# Patient Record
Sex: MALE | Race: WHITE | NOT HISPANIC OR LATINO | Employment: FULL TIME | URBAN - METROPOLITAN AREA
[De-identification: names, ages, dates, MRNs, and addresses within clinical notes are randomized per-mention and may not be internally consistent; named-entity substitution may affect disease eponyms.]

---

## 2017-04-09 ENCOUNTER — HOSPITAL ENCOUNTER (OUTPATIENT)
Facility: HOSPITAL | Age: 47
Discharge: HOME | End: 2017-04-11
Attending: EMERGENCY MEDICINE | Admitting: INTERNAL MEDICINE
Payer: MEDICAID

## 2017-04-09 DIAGNOSIS — R07.9 CHEST PAIN, UNSPECIFIED TYPE: ICD-10-CM

## 2017-04-09 DIAGNOSIS — I10 ESSENTIAL HYPERTENSION: Chronic | ICD-10-CM

## 2017-04-09 DIAGNOSIS — R79.89 ELEVATED D-DIMER: ICD-10-CM

## 2017-04-09 DIAGNOSIS — F33.0 MILD RECURRENT MAJOR DEPRESSION: Chronic | ICD-10-CM

## 2017-04-09 DIAGNOSIS — R07.9 CHEST PAIN: Primary | ICD-10-CM

## 2017-04-09 DIAGNOSIS — R94.39 ABNORMAL NUCLEAR STRESS TEST: ICD-10-CM

## 2017-04-09 DIAGNOSIS — K21.9 GASTROESOPHAGEAL REFLUX DISEASE WITHOUT ESOPHAGITIS: ICD-10-CM

## 2017-04-09 LAB
ALBUMIN SERPL BCP-MCNC: 3.8 G/DL
ALP SERPL-CCNC: 61 U/L
ALT SERPL W/O P-5'-P-CCNC: 34 U/L
ANION GAP SERPL CALC-SCNC: 8 MMOL/L
AST SERPL-CCNC: 27 U/L
BASOPHILS # BLD AUTO: 0.03 K/UL
BASOPHILS NFR BLD: 0.5 %
BILIRUB SERPL-MCNC: 0.4 MG/DL
BNP SERPL-MCNC: 15 PG/ML
BUN SERPL-MCNC: 22 MG/DL
CALCIUM SERPL-MCNC: 9.1 MG/DL
CHLORIDE SERPL-SCNC: 104 MMOL/L
CO2 SERPL-SCNC: 28 MMOL/L
CREAT SERPL-MCNC: 1.2 MG/DL
D DIMER PPP IA.FEU-MCNC: 0.88 MG/L FEU
DIFFERENTIAL METHOD: ABNORMAL
EOSINOPHIL # BLD AUTO: 0.2 K/UL
EOSINOPHIL NFR BLD: 3.3 %
ERYTHROCYTE [DISTWIDTH] IN BLOOD BY AUTOMATED COUNT: 13.6 %
EST. GFR  (AFRICAN AMERICAN): >60 ML/MIN/1.73 M^2
EST. GFR  (NON AFRICAN AMERICAN): >60 ML/MIN/1.73 M^2
GLUCOSE SERPL-MCNC: 85 MG/DL
HCT VFR BLD AUTO: 41.2 %
HGB BLD-MCNC: 13.4 G/DL
INR PPP: 1
LYMPHOCYTES # BLD AUTO: 1.4 K/UL
LYMPHOCYTES NFR BLD: 23.8 %
MAGNESIUM SERPL-MCNC: 2 MG/DL
MCH RBC QN AUTO: 25.8 PG
MCHC RBC AUTO-ENTMCNC: 32.5 %
MCV RBC AUTO: 79 FL
MONOCYTES # BLD AUTO: 0.5 K/UL
MONOCYTES NFR BLD: 9.2 %
NEUTROPHILS # BLD AUTO: 3.6 K/UL
NEUTROPHILS NFR BLD: 63 %
PLATELET # BLD AUTO: 228 K/UL
PMV BLD AUTO: 9.4 FL
POTASSIUM SERPL-SCNC: 4 MMOL/L
PROT SERPL-MCNC: 6.5 G/DL
PROTHROMBIN TIME: 10.9 SEC
RBC # BLD AUTO: 5.19 M/UL
SODIUM SERPL-SCNC: 140 MMOL/L
TROPONIN I SERPL DL<=0.01 NG/ML-MCNC: <0.006 NG/ML
WBC # BLD AUTO: 5.68 K/UL

## 2017-04-09 PROCEDURE — 85379 FIBRIN DEGRADATION QUANT: CPT

## 2017-04-09 PROCEDURE — 83735 ASSAY OF MAGNESIUM: CPT

## 2017-04-09 PROCEDURE — 25000003 PHARM REV CODE 250: Performed by: EMERGENCY MEDICINE

## 2017-04-09 PROCEDURE — 96374 THER/PROPH/DIAG INJ IV PUSH: CPT

## 2017-04-09 PROCEDURE — 83880 ASSAY OF NATRIURETIC PEPTIDE: CPT

## 2017-04-09 PROCEDURE — 85610 PROTHROMBIN TIME: CPT

## 2017-04-09 PROCEDURE — 85025 COMPLETE CBC W/AUTO DIFF WBC: CPT

## 2017-04-09 PROCEDURE — 99285 EMERGENCY DEPT VISIT HI MDM: CPT | Mod: 25

## 2017-04-09 PROCEDURE — 80053 COMPREHEN METABOLIC PANEL: CPT

## 2017-04-09 PROCEDURE — 84484 ASSAY OF TROPONIN QUANT: CPT

## 2017-04-09 RX ORDER — ASPIRIN 325 MG
325 TABLET ORAL
Status: COMPLETED | OUTPATIENT
Start: 2017-04-09 | End: 2017-04-09

## 2017-04-09 RX ORDER — ESOMEPRAZOLE MAGNESIUM 40 MG/1
40 CAPSULE, DELAYED RELEASE ORAL
COMMUNITY
End: 2019-07-14

## 2017-04-09 RX ORDER — NITROGLYCERIN 0.4 MG/1
0.4 TABLET SUBLINGUAL EVERY 5 MIN PRN
Status: DISCONTINUED | OUTPATIENT
Start: 2017-04-09 | End: 2017-04-10 | Stop reason: SDUPTHER

## 2017-04-09 RX ORDER — METOPROLOL TARTRATE 1 MG/ML
5 INJECTION, SOLUTION INTRAVENOUS
Status: COMPLETED | OUTPATIENT
Start: 2017-04-09 | End: 2017-04-09

## 2017-04-09 RX ADMIN — ASPIRIN 325 MG ORAL TABLET 325 MG: 325 PILL ORAL at 08:04

## 2017-04-09 RX ADMIN — METOPROLOL TARTRATE 5 MG: 5 INJECTION INTRAVENOUS at 08:04

## 2017-04-09 NOTE — IP AVS SNAPSHOT
[place] [address] [place] [address]  Phone: [phone]           Patient Discharge Instructions   Our goal is to set you up for success. This packet includes information on your condition, medications, and your home care.  It will help you care for yourself to prevent having to return to the hospital.     Please ask your nurse if you have any questions.      There are many details to remember when preparing to leave the hospital. Here is what you will need to do:    1. Take your medicine. If you are prescribed medications, review your Medication List on the following pages. You may have new medications to  at the pharmacy and others that you'll need to stop taking. Review the instructions for how and when to take your medications. Talk with your doctor or nurses if you are unsure of what to do.     2. Go to your follow-up appointments. Specific follow-up information is listed in the following pages. Your may be contacted by a nurse or clinical provider about future appointments. Be sure we have all of the phone numbers to reach you. Please contact your provider's office if you are unable to make an appointment.     3. Watch for warning signs. Your doctor or nurse will give you detailed warning signs to watch for and when to call for assistance. These instructions may also include educational information about your condition. If you experience any of warning signs to your health, call your doctor.           [facility]  Unless otherwise directed by your provider, please   contact [facility], our nurse care line   that is available for 24/7 assistance.     [phone] (toll-free)     Registered nurses in the [facility]   provide: appointment scheduling, clinical advisement, health education, and other advisory services.                  ** Verify the list of medication(s) below is accurate and up to date. Carry this with you in case of emergency.  If your medications have changed, please notify your healthcare provider.             Medication List      START taking these medications        Additional Info                      aspirin 81 MG EC tablet   Commonly known as:  ECOTRIN   Refills:  0   Dose:  81 mg    Last time this was given:  81 mg on 4/11/2017  9:44 AM   Instructions:  Take 1 tablet (81 mg total) by mouth once daily.     Begin Date    AM    Noon    PM    Bedtime       atorvastatin 40 MG tablet   Commonly known as:  LIPITOR   Quantity:  30 tablet   Refills:  2   Dose:  20 mg    Last time this was given:  40 mg on 4/10/2017  8:17 PM   Instructions:  Take 0.5 tablets (20 mg total) by mouth every evening.     Begin Date    AM    Noon    PM    Bedtime         CONTINUE taking these medications        Additional Info                      buPROPion 200 MG Tbsr   Commonly known as:  WELLBUTRIN SR   Refills:  0   Dose:  100 mg    Instructions:  Take 100 mg by mouth 2 (two) times daily.     Begin Date    AM    Noon    PM    Bedtime       esomeprazole 40 MG capsule   Commonly known as:  NEXIUM   Refills:  0   Dose:  40 mg    Instructions:  Take 40 mg by mouth before breakfast.     Begin Date    AM    Noon    PM    Bedtime       fluoxetine 20 MG capsule   Commonly known as:  PROZAC   Refills:  0      Begin Date    AM    Noon    PM    Bedtime       hydrochlorothiazide 25 MG tablet   Commonly known as:  HYDRODIURIL   Quantity:  30 tablet   Refills:  5   Dose:  25 mg    Last time this was given:  25 mg on 4/11/2017  9:44 AM   Instructions:  Take 1 tablet (25 mg total) by mouth once daily.     Begin Date    AM    Noon    PM    Bedtime       hydrocodone-acetaminophen 5-325mg 5-325 mg per tablet   Commonly known as:  NORCO   Quantity:  20 tablet   Refills:  0   Dose:  1 tablet    Instructions:  Take 1 tablet by mouth every 4 (four) hours as needed for Pain.     Begin Date    AM    Noon    PM    Bedtime       mupirocin 2 % ointment   Commonly known as:  BACTROBAN    Refills:  0      Begin Date    AM    Noon    PM    Bedtime       naproxen 500 MG tablet   Commonly known as:  NAPROSYN   Quantity:  14 tablet   Refills:  0   Dose:  500 mg    Instructions:  Take 1 tablet (500 mg total) by mouth 2 (two) times daily with meals.     Begin Date    AM    Noon    PM    Bedtime       omeprazole 40 MG capsule   Commonly known as:  PRILOSEC   Refills:  0   Dose:  40 mg    Instructions:  Take 40 mg by mouth once daily.     Begin Date    AM    Noon    PM    Bedtime       sulfamethoxazole-trimethoprim 800-160mg 800-160 mg Tab   Commonly known as:  BACTRIM DS   Refills:  0      Begin Date    AM    Noon    PM    Bedtime            Where to Get Your Medications      These medications were sent to [place] [id number] [facility] [address]     Phone:  [phone]     atorvastatin 40 MG tablet         You can get these medications from any pharmacy     You don't need a prescription for these medications     aspirin 81 MG EC tablet                  Please bring to all follow up appointments:    1. A copy of your discharge instructions.  2. All medicines you are currently taking in their original bottles.  3. Identification and insurance card.    Please arrive 15 minutes ahead of scheduled appointment time.    Please call 24 hours in advance if you must reschedule your appointment and/or time.        Your Scheduled Appointments     Apr 20, 2017  9:40 AM CDT   Established Patient Visit with Danny Mason MD   [facility] [place] - Cardiology ([facility])    [address] [facility] [address]  [place] [address] [phone]              Follow-up Information     Follow up with Rupert Alcantar MD On 4/25/2017.    Specialty:  Family Medicine    Why:  out patient services:  11:15 a.m. follow up from the  hospital     Contact information:    [address]  [address]  [place] [address] [phone]          Follow up with Danny MARIE  MD [place] On 4/20/2017.    Specialties:  Cardiology, INTERVENTIONAL CARDIOLOGY    Why:  out patient services:  9;40 a.m. follow up from the hospital    Contact information:    [address]  [address]  [place]  [phone]          Discharge Instructions     Future Orders    Activity as tolerated     Diet general     Questions:    Total calories:      Fat restriction, if any:      Protein restriction, if any:      Na restriction, if any:  2gNa    Fluid restriction:      Additional restrictions:          Primary Diagnosis     Your primary diagnosis was:  Chest Pain      Admission Information     Date & Time Provider Department CSN    4/9/2017  8:10 PM Miriam Leal MD [facility] [id number]      Care Providers     Provider Role Specialty Primary office phone    Miriam Leal MD Attending Provider Hospitalist [phone]    Danny Mason MD Consulting Physician  Cardiology [phone]      Your Vitals Were     BP Pulse Temp Resp Height Weight    131/77 (BP Method: Automatic) 58 98.2 °F (36.8 °C) (Oral) 18 6' (1.829 m) 124.8 kg (275 lb 3.2 oz)    SpO2 BMI             96% 37.32 kg/m2         Recent Lab Values     No lab values to display.      Allergies as of 4/11/2017     No Known Allergies      Advance Directives     An advance directive is a document which, in the event you are no longer able to make decisions for yourself, tells your healthcare team what kind of treatment you do or do not want to receive, or who you would like to make those decisions for you.  If you do not currently have an advance directive, [facility] encourages you to create one.  For more information call:  [phone]), [phone] ([phone]),  or log on to [email or web address].        Language Assistance Services     ATTENTION: Language assistance services are available, free of charge. Please call [phone].      ATENCIÓN: Si habla español, tiene a carmen disposición servicios  devos de asistencia lingüística. Alvino stevens [phone].     ANGELIA Ý: N?u b?n nói Ti?ng Vi?t, có các d?ch v? h? tr? ngôn ng? mi?n phí dành cho b?n. G?i s? [phone].         [facility] complies with applicable Federal civil rights laws and does not discriminate on the basis of race, color, national origin, age, disability, or sex.

## 2017-04-10 PROBLEM — R79.89 ELEVATED D-DIMER: Status: ACTIVE | Noted: 2017-04-10

## 2017-04-10 PROBLEM — K21.9 GASTROESOPHAGEAL REFLUX DISEASE WITHOUT ESOPHAGITIS: Status: ACTIVE | Noted: 2017-04-10

## 2017-04-10 PROBLEM — R07.9 CHEST PAIN: Status: ACTIVE | Noted: 2017-04-10

## 2017-04-10 LAB
APTT BLDCRRT: 26.5 SEC
CHOLEST/HDLC SERPL: 7.1 {RATIO}
DIASTOLIC DYSFUNCTION: NO
DIASTOLIC DYSFUNCTION: NO
ESTIMATED PA SYSTOLIC PRESSURE: 27.71
GLOBAL PERICARDIAL EFFUSION: NORMAL
HDL/CHOLESTEROL RATIO: 14 %
HDLC SERPL-MCNC: 157 MG/DL
HDLC SERPL-MCNC: 22 MG/DL
INR PPP: 1
LDLC SERPL CALC-MCNC: 82.6 MG/DL
MITRAL VALVE MOBILITY: NORMAL
MITRAL VALVE REGURGITATION: NORMAL
NONHDLC SERPL-MCNC: 135 MG/DL
PROTHROMBIN TIME: 10.8 SEC
RETIRED EF AND QEF - SEE NOTES: 55 (ref 55–65)
T4 FREE SERPL-MCNC: 0.73 NG/DL
TRICUSPID VALVE REGURGITATION: NORMAL
TRIGL SERPL-MCNC: 262 MG/DL
TROPONIN I SERPL DL<=0.01 NG/ML-MCNC: <0.006 NG/ML
TROPONIN I SERPL DL<=0.01 NG/ML-MCNC: <0.006 NG/ML
TSH SERPL DL<=0.005 MIU/L-ACNC: 4.32 UIU/ML

## 2017-04-10 PROCEDURE — 93016 CV STRESS TEST SUPVJ ONLY: CPT | Mod: ,,, | Performed by: INTERNAL MEDICINE

## 2017-04-10 PROCEDURE — 36415 COLL VENOUS BLD VENIPUNCTURE: CPT

## 2017-04-10 PROCEDURE — 80061 LIPID PANEL: CPT

## 2017-04-10 PROCEDURE — 78452 HT MUSCLE IMAGE SPECT MULT: CPT | Mod: 26,,, | Performed by: INTERNAL MEDICINE

## 2017-04-10 PROCEDURE — 93018 CV STRESS TEST I&R ONLY: CPT | Mod: ,,, | Performed by: INTERNAL MEDICINE

## 2017-04-10 PROCEDURE — 93306 TTE W/DOPPLER COMPLETE: CPT

## 2017-04-10 PROCEDURE — 63600175 PHARM REV CODE 636 W HCPCS

## 2017-04-10 PROCEDURE — G0378 HOSPITAL OBSERVATION PER HR: HCPCS

## 2017-04-10 PROCEDURE — 85610 PROTHROMBIN TIME: CPT

## 2017-04-10 PROCEDURE — 85730 THROMBOPLASTIN TIME PARTIAL: CPT

## 2017-04-10 PROCEDURE — 99220 PR INITIAL OBSERVATION CARE,LEVL III: CPT | Mod: 25,,, | Performed by: INTERNAL MEDICINE

## 2017-04-10 PROCEDURE — 84484 ASSAY OF TROPONIN QUANT: CPT | Mod: 91

## 2017-04-10 PROCEDURE — 93017 CV STRESS TEST TRACING ONLY: CPT

## 2017-04-10 PROCEDURE — 25000003 PHARM REV CODE 250: Performed by: INTERNAL MEDICINE

## 2017-04-10 PROCEDURE — 93306 TTE W/DOPPLER COMPLETE: CPT | Mod: 26,,, | Performed by: INTERNAL MEDICINE

## 2017-04-10 PROCEDURE — 84439 ASSAY OF FREE THYROXINE: CPT

## 2017-04-10 PROCEDURE — 25500020 PHARM REV CODE 255: Performed by: HOSPITALIST

## 2017-04-10 PROCEDURE — 84443 ASSAY THYROID STIM HORMONE: CPT

## 2017-04-10 PROCEDURE — 25000003 PHARM REV CODE 250: Performed by: EMERGENCY MEDICINE

## 2017-04-10 RX ORDER — ACETAMINOPHEN 325 MG/1
650 TABLET ORAL EVERY 6 HOURS PRN
Status: DISCONTINUED | OUTPATIENT
Start: 2017-04-10 | End: 2017-04-11 | Stop reason: HOSPADM

## 2017-04-10 RX ORDER — MAG HYDROX/ALUMINUM HYD/SIMETH 200-200-20
30 SUSPENSION, ORAL (FINAL DOSE FORM) ORAL EVERY 6 HOURS PRN
Status: DISCONTINUED | OUTPATIENT
Start: 2017-04-10 | End: 2017-04-11 | Stop reason: HOSPADM

## 2017-04-10 RX ORDER — REGADENOSON 0.08 MG/ML
INJECTION, SOLUTION INTRAVENOUS
Status: DISCONTINUED
Start: 2017-04-10 | End: 2017-04-10 | Stop reason: WASHOUT

## 2017-04-10 RX ORDER — HYDROCHLOROTHIAZIDE 25 MG/1
25 TABLET ORAL DAILY
Status: DISCONTINUED | OUTPATIENT
Start: 2017-04-10 | End: 2017-04-11 | Stop reason: HOSPADM

## 2017-04-10 RX ORDER — ASPIRIN 325 MG
325 TABLET ORAL DAILY
Status: DISCONTINUED | OUTPATIENT
Start: 2017-04-10 | End: 2017-04-10

## 2017-04-10 RX ORDER — ONDANSETRON 2 MG/ML
4 INJECTION INTRAMUSCULAR; INTRAVENOUS EVERY 8 HOURS PRN
Status: DISCONTINUED | OUTPATIENT
Start: 2017-04-10 | End: 2017-04-11 | Stop reason: HOSPADM

## 2017-04-10 RX ORDER — CLOPIDOGREL 300 MG/1
300 TABLET, FILM COATED ORAL ONCE
Status: COMPLETED | OUTPATIENT
Start: 2017-04-10 | End: 2017-04-10

## 2017-04-10 RX ORDER — ATORVASTATIN CALCIUM 40 MG/1
40 TABLET, FILM COATED ORAL NIGHTLY
Status: DISCONTINUED | OUTPATIENT
Start: 2017-04-10 | End: 2017-04-11 | Stop reason: HOSPADM

## 2017-04-10 RX ORDER — SODIUM CHLORIDE 9 MG/ML
INJECTION, SOLUTION INTRAVENOUS CONTINUOUS
Status: DISCONTINUED | OUTPATIENT
Start: 2017-04-11 | End: 2017-04-11

## 2017-04-10 RX ORDER — NITROGLYCERIN 0.4 MG/1
0.4 TABLET SUBLINGUAL EVERY 5 MIN PRN
Status: DISCONTINUED | OUTPATIENT
Start: 2017-04-10 | End: 2017-04-11 | Stop reason: HOSPADM

## 2017-04-10 RX ORDER — ASPIRIN 81 MG/1
81 TABLET ORAL DAILY
Status: DISCONTINUED | OUTPATIENT
Start: 2017-04-11 | End: 2017-04-11 | Stop reason: HOSPADM

## 2017-04-10 RX ORDER — PANTOPRAZOLE SODIUM 40 MG/1
40 TABLET, DELAYED RELEASE ORAL DAILY
Status: DISCONTINUED | OUTPATIENT
Start: 2017-04-10 | End: 2017-04-11 | Stop reason: HOSPADM

## 2017-04-10 RX ORDER — CLOPIDOGREL BISULFATE 75 MG/1
75 TABLET ORAL DAILY
Status: DISCONTINUED | OUTPATIENT
Start: 2017-04-11 | End: 2017-04-11

## 2017-04-10 RX ADMIN — HYDROCHLOROTHIAZIDE 25 MG: 25 TABLET ORAL at 08:04

## 2017-04-10 RX ADMIN — ATORVASTATIN CALCIUM 40 MG: 40 TABLET, FILM COATED ORAL at 08:04

## 2017-04-10 RX ADMIN — ACETAMINOPHEN 650 MG: 325 TABLET ORAL at 02:04

## 2017-04-10 RX ADMIN — PANTOPRAZOLE SODIUM 40 MG: 40 TABLET, DELAYED RELEASE ORAL at 08:04

## 2017-04-10 RX ADMIN — IOHEXOL 70 ML: 350 INJECTION, SOLUTION INTRAVENOUS at 10:04

## 2017-04-10 RX ADMIN — ASPIRIN 325 MG ORAL TABLET 325 MG: 325 PILL ORAL at 08:04

## 2017-04-10 RX ADMIN — CLOPIDOGREL BISULFATE 300 MG: 300 TABLET, FILM COATED ORAL at 06:04

## 2017-04-10 NOTE — PLAN OF CARE
Problem: Fall Risk (Adult)  Intervention: Monitor/Assist with Self Care    04/10/17 0230   Functional Level Current   Ambulation 0 - independent   Transferring 0 - independent   Toileting 0 - independent   Bathing 0 - independent   Dressing 0 - independent   Eating 0 - independent   Communication 0 - understands/communicates without difficulty   Swallowing 0 - swallows foods/liquids without difficulty   Daily Care Interventions   Self-Care Promotion independence encouraged   Activity   Activity Assistance Provided independent       Intervention: Reduce Risk/Promote Restraint Free Environment    04/10/17 0428   Safety Interventions   Environmental Safety Modification clutter free environment maintained;lighting adjusted;room near unit station;room organization consistent       Intervention: Patient Rounds    04/10/17 0428   Safety Interventions   Patient Rounds bed in low position;bed wheels locked;call light in reach;placement of personal items at bedside;ID band on;clutter free environment maintained;toileting offered;visualized patient       Intervention: Safety Promotion/Fall Prevention    04/10/17 0230   Safety Interventions   Safety Promotion/Fall Prevention lighting adjusted;medications reviewed;room near unit station;side rails raised x 2         Goal: Absence of Falls  Patient will demonstrate the desired outcomes by discharge/transition of care.     04/10/17 0428   Fall Risk (Adult)   Absence of Falls making progress toward outcome         Problem: Patient Care Overview  Goal: Plan of Care Review    04/10/17 0230   Coping/Psychosocial   Plan Of Care Reviewed With patient         Problem: Cardiac: ACS (Acute Coronary Syndrome) (Adult)  Intervention: Optimize Myocardial Oxygenation/Perfusion    04/10/17 0230 04/10/17 0428   Respiratory Interventions   Airway/Ventilation Management --  airway patency maintained   Activity   Activity Type bedrest with bathroom privileges --        Intervention: Prevent/Manage  Thrombi/Emboli    04/10/17 0230 04/10/17 0428   Safety Interventions   Bleeding Precautions --  blood pressure closely monitored   OTHER   VTE Required Core Measure Pharmacological prophylaxis initiated/maintained --          Goal: Signs and Symptoms of Listed Potential Problems Will be Absent, Minimized or Managed (Cardiac: ACS)  Signs and symptoms of listed potential problems will be absent, minimized or managed by discharge/transition of care (reference Cardiac: ACS (Acute Coronary Syndrome) (Adult) CPG).    04/10/17 0428   Cardiac: ACS (Acute Coronary Syndrome)   Problems Assessed (Acute Coronary Syndrome) all   Problems Present (Acute Coronary Syndrome) chest pain (angina);situational response

## 2017-04-10 NOTE — ASSESSMENT & PLAN NOTE
Both typical and atypical features.  Occurred at rest, but resolved with NTG/ASA.  EKG nondiagnostic and EKG normal.  Elev D-dimer also noted  Will check Ex MPI and Echo  Check lipid panel  CTA chest deferred to IM svc (case d/w TYLOR Daniel)

## 2017-04-10 NOTE — H&P
"[facility] Hospital Medicine  History & Physical    Patient Name: Dean Lozano  MRN: [id number]  Admission Date: 4/9/2017  Attending Physician: Miriam schafer  Primary Care Provider: Ayah De Leon MD         Patient information was obtained from patient and ER records.     Subjective:     Principal Problem:Chest pain    Chief Complaint:   Chief Complaint   Patient presents with    Chest Pain     PT C/O CP THAT RADIATES DOWN LA, 2/10 PAIN. VSS, NO S/S OF ACUTE DISTRESS AT THIS TIME.        HPI: 46 yo male with significant history for hypertension and depression (no longer on wellbutrin or prozac who is admitted to observation for acute sudden left chest pain radiating down left arm that occurred at 630 pm yesterday while driving. Patient is a . Symptoms associated with shortness of breath but denies headaches, dizziness, change in vision, palpitations, diaphoresis, orthopnea, PND, abdominal pain, nausea, or vomiting, or extremities weakness. Chest pain would eased up but return shortly with more intense pain each time. Patient return home where he called EMS. Received ASA, nitro SL x 3, and nitro paste before pain improved. Denies recent sick contacts, travel, or injury. Takes aleve as needed (last dose 3 weeks ago). No new physical limitation in the past month. Prior to event, in usual state of health. No recent heavy exercise. Denies smoking or illicit drug use. Occasionally drinks alcohol. No family of MI/SCD. Report "cholesterol always good."    In ED, EKG SB no ischemic changes and troponin negative x 2. D dimer positive 0.88. CXR clear. Stress test ordered.         Past Medical History:   Diagnosis Date    Anxiety     GERD (gastroesophageal reflux disease)     Hypertension        Past Surgical History:   Procedure Laterality Date    HAND SURGERY      ROTATOR CUFF REPAIR      SHOULDER SURGERY         Review of patient's allergies indicates:  No Known Allergies    No " current facility-administered medications on file prior to encounter.      Current Outpatient Prescriptions on File Prior to Encounter   Medication Sig    hydrochlorothiazide (HYDRODIURIL) 25 MG tablet Take 1 tablet (25 mg total) by mouth once daily.    buPROPion (WELLBUTRIN SR) 200 MG TbSR Take 100 mg by mouth 2 (two) times daily.    fluoxetine (PROZAC) 20 MG capsule     hydrocodone-acetaminophen 5-325mg (NORCO) 5-325 mg per tablet Take 1 tablet by mouth every 4 (four) hours as needed for Pain.    mupirocin (BACTROBAN) 2 % ointment     naproxen (NAPROSYN) 500 MG tablet Take 1 tablet (500 mg total) by mouth 2 (two) times daily with meals.    omeprazole (PRILOSEC) 40 MG capsule Take 40 mg by mouth once daily.    sulfamethoxazole-trimethoprim 800-160mg (BACTRIM DS) 800-160 mg Tab      Family History     None        Social History Main Topics    Smoking status: Never Smoker    Smokeless tobacco: Not on file    Alcohol use Not on file    Drug use: Not on file    Sexual activity: Not on file     Review of Systems   Constitutional: Negative.    HENT: Negative.    Eyes: Negative.    Respiratory: Positive for chest tightness and shortness of breath. Negative for cough, wheezing and stridor.    Cardiovascular: Positive for chest pain. Negative for palpitations and leg swelling.   Gastrointestinal: Negative.    Genitourinary: Negative.    Musculoskeletal: Negative.    Skin: Negative.    Neurological: Numbness: right arm.   Psychiatric/Behavioral: Negative.      Objective:     Vital Signs (Most Recent):  Temp: 98.2 °F (36.8 °C) (04/10/17 0800)  Pulse: 65 (04/10/17 0800)  Resp: 17 (04/10/17 0800)  BP: 118/68 (04/10/17 0800)  SpO2: 95 % (04/10/17 0800) Vital Signs (24h Range):  Temp:  [98.2 °F (36.8 °C)-98.6 °F (37 °C)] 98.2 °F (36.8 °C)  Pulse:  [54-80] 65  Resp:  [15-17] 17  SpO2:  [94 %-98 %] 95 %  BP: (118-141)/(60-88) 118/68     Weight: 123.2 kg (271 lb 8 oz)  Body mass index is 36.82 kg/(m^2).    Physical Exam    Constitutional: He is oriented to person, place, and time. He appears well-developed and well-nourished. No distress.   HENT:   Head: Normocephalic and atraumatic.   Eyes: EOM are normal. Pupils are equal, round, and reactive to light. Left eye exhibits no discharge.   Neck: Normal range of motion. Neck supple.   Cardiovascular: Normal rate and regular rhythm.    No murmur heard.  Pulmonary/Chest: Effort normal and breath sounds normal.   Abdominal: Soft. Bowel sounds are normal.   Musculoskeletal: Normal range of motion. He exhibits no edema, tenderness or deformity.   Neurological: He is alert and oriented to person, place, and time. No cranial nerve deficit.   Skin: Skin is warm and dry.   Psychiatric: He has a normal mood and affect.        Significant Labs: All pertinent labs within the past 24 hours have been reviewed.    Significant Imaging: I have reviewed all pertinent imaging results/findings within the past 24 hours.    Assessment/Plan:     * Chest pain  Currently, has no chest pain or shortness of breath. Though work up thus negative thus far, story is concerning. Continue ASA. Obtain lipid panel, TSH, 2 D echo, NST, and Cardiology consult.       Elevated d-dimer  Patient presented with chest pain and ACS being ruled out. Patient have no risk factor of DVT or PE.       Essential hypertension  Controlled. Continue hctz.       Mild recurrent major depression  Stable. No longer on well butrin or prozac.       Gastroesophageal reflux disease without esophagitis  Continue PPI.       VTE Risk Mitigation     None        Mell Daniel NP  Department of Hospital Medicine   [facility]

## 2017-04-10 NOTE — ASSESSMENT & PLAN NOTE
Patient presented with chest pain and ACS being ruled out. Patient have no risk factor of DVT or PE.

## 2017-04-10 NOTE — ED TRIAGE NOTES
Pt reports midsternal chest pain radiating left arm starting an hour ago. Pain rated as 6/10 currently. Received 3 nitro and a nitro patch en route. Reports SOB. Denies dizziness, weakness, nv.

## 2017-04-10 NOTE — ASSESSMENT & PLAN NOTE
Though work up thus negative thus far, story is concerning. Continue ASA. Obtain lipid panel, TSH, 2 D echo, NST, and Cardiology consult.

## 2017-04-10 NOTE — CONSULTS
[facility]  Cardiology  Consult Note    Patient Name: Dean Lozano  MRN: [id number]  Admission Date: 4/9/2017  Hospital Length of Stay: 0 days  Code Status: Full Code   Attending Provider: Miriam Leal MD   Consulting Provider: Danny Marie MD  Primary Care Physician: Ayah De Leon MD  Principal Problem:Chest pain    Patient information was obtained from patient and ER records.     Inpatient consult to Cardiology  Consult performed by: DANNY MARIE  Consult ordered by: DIANA AZUL  Reason for consult: CP        Subjective:     Chief Complaint:  CP     HPI:   47 y.o. M with a past medical history of Anxiety; GERD; and Hypertension who presents to the ED via EMS for evaluation of acute, intermittent L-sided chest pain radiating inbetween his shoulders and down the left arm with associated shortness of breath that began while driving PTA. Pain was initially severe (9/10) but is currently mild (2/10) after EMS administered Aspirin and NTG en route. Pain is not worse with inspiration or movement of the L arm. He states that symptoms do not feel like GERD. Patient denies palpitations, dizziness, weakness, leg swelling, diaphoresis, and/or abdominal pain. No PMHx of DM, HLD, and/or Anxiety. No prior cardiac evaluation. No FHx CAD. Patient is a non-smoker but reports occasional alcohol consumption.    The pt describes stabbing CP rad to L arm resolved with NTG/ASA in ER.  Currently pain free.  No prior cardiac hx/testing.  EKG non diagnostic, CE neg.  Elev d-dimer noted.      Past Medical History:   Diagnosis Date    Anxiety     GERD (gastroesophageal reflux disease)     Hypertension        Past Surgical History:   Procedure Laterality Date    HAND SURGERY      ROTATOR CUFF REPAIR      SHOULDER SURGERY         Review of patient's allergies indicates:  No Known Allergies    Scheduled Meds:   aspirin  325 mg Oral Daily    hydrochlorothiazide  25 mg Oral Daily     pantoprazole  40 mg Oral Daily     Continuous Infusions:   PRN Meds:.acetaminophen, aluminum-magnesium hydroxide-simethicone, nitroGLYCERIN, ondansetron      No current facility-administered medications on file prior to encounter.      Current Outpatient Prescriptions on File Prior to Encounter   Medication Sig    hydrochlorothiazide (HYDRODIURIL) 25 MG tablet Take 1 tablet (25 mg total) by mouth once daily.    buPROPion (WELLBUTRIN SR) 200 MG TbSR Take 100 mg by mouth 2 (two) times daily.    fluoxetine (PROZAC) 20 MG capsule     hydrocodone-acetaminophen 5-325mg (NORCO) 5-325 mg per tablet Take 1 tablet by mouth every 4 (four) hours as needed for Pain.    mupirocin (BACTROBAN) 2 % ointment     naproxen (NAPROSYN) 500 MG tablet Take 1 tablet (500 mg total) by mouth 2 (two) times daily with meals.    omeprazole (PRILOSEC) 40 MG capsule Take 40 mg by mouth once daily.    sulfamethoxazole-trimethoprim 800-160mg (BACTRIM DS) 800-160 mg Tab      Family History     None        Social History Main Topics    Smoking status: Never Smoker    Smokeless tobacco: Not on file    Alcohol use Not on file    Drug use: Not on file    Sexual activity: Not on file     Review of Systems   Constitution: Negative for chills, diaphoresis, fever, weakness and malaise/fatigue.   HENT: Negative for headaches and nosebleeds.    Eyes: Negative for blurred vision and double vision.   Cardiovascular: Positive for chest pain. Negative for claudication, cyanosis, dyspnea on exertion, leg swelling, orthopnea, palpitations, paroxysmal nocturnal dyspnea and syncope.   Respiratory: Negative for cough, shortness of breath and wheezing.    Skin: Negative for dry skin and poor wound healing.   Musculoskeletal: Negative for back pain, joint swelling and myalgias.   Gastrointestinal: Negative for abdominal pain, nausea and vomiting.   Genitourinary: Negative for hematuria.   Neurological: Negative for dizziness, numbness and seizures.    Psychiatric/Behavioral: Negative for altered mental status and depression.     Objective:     Vital Signs (Most Recent):  Temp: 98.2 °F (36.8 °C) (04/10/17 0800)  Pulse: 65 (04/10/17 0800)  Resp: 17 (04/10/17 0800)  BP: 118/68 (04/10/17 0800)  SpO2: 95 % (04/10/17 0800) Vital Signs (24h Range):  Temp:  [98.2 °F (36.8 °C)-98.6 °F (37 °C)] 98.2 °F (36.8 °C)  Pulse:  [54-80] 65  Resp:  [15-17] 17  SpO2:  [94 %-98 %] 95 %  BP: (118-141)/(60-88) 118/68     Weight: 123.2 kg (271 lb 8 oz)  Body mass index is 36.82 kg/(m^2).    SpO2: 95 %  O2 Device (Oxygen Therapy): room air      Intake/Output Summary (Last 24 hours) at 04/10/17 0931  Last data filed at 04/10/17 0700   Gross per 24 hour   Intake               50 ml   Output                0 ml   Net               50 ml       Lines/Drains/Airways     Peripheral Intravenous Line                 Peripheral IV - Single Lumen 04/09/17 1954 Left Antecubital less than 1 day                Physical Exam   Constitutional: He is oriented to person, place, and time. He appears well-developed and well-nourished.   HENT:   Head: Normocephalic and atraumatic.   Eyes: Conjunctivae and EOM are normal. Pupils are equal, round, and reactive to light. No scleral icterus.   Neck: Neck supple. No JVD present. Carotid bruit is not present. No tracheal deviation present. No thyromegaly present.   Cardiovascular: Normal rate, regular rhythm, S1 normal, S2 normal and intact distal pulses.  Exam reveals no gallop and no friction rub.    No murmur heard.  Pulmonary/Chest: Effort normal and breath sounds normal. He has no wheezes. He exhibits no tenderness.   Abdominal: Soft. Bowel sounds are normal. He exhibits no distension and no mass. There is no tenderness. There is no rebound and no guarding.   Musculoskeletal: He exhibits no edema.   Neurological: He is alert and oriented to person, place, and time. He has normal strength. No cranial nerve deficit.   Skin: Skin is warm and dry. No rash  noted.   Psychiatric: He has a normal mood and affect. His behavior is normal.       Significant Labs:   CMP   Recent Labs  Lab 04/09/17 2043      K 4.0      CO2 28   GLU 85   BUN 22*   CREATININE 1.2   CALCIUM 9.1   PROT 6.5   ALBUMIN 3.8   BILITOT 0.4   ALKPHOS 61   AST 27   ALT 34   ANIONGAP 8   ESTGFRAFRICA >60   EGFRNONAA >60   , CBC   Recent Labs  Lab 04/09/17 2043   WBC 5.68   HGB 13.4*   HCT 41.2      , INR   Recent Labs  Lab 04/09/17  2043 04/10/17  0307   INR 1.0 1.0     Recent Labs  Lab 04/09/17  2043 04/10/17  0542   TROPONINI <0.006 <0.006     No results found for: CHOL  No results found for: HDL  No results found for: LDLCALC  No results found for: TRIG  No results found for: CHOLHDL      Significant Imaging:   CXR 4/9/17 (pers rev images) NAD    EKG   4/9/17 2016 SR 59, LVH      Assessment and Plan:     * Chest pain  Both typical and atypical features.  Occurred at rest, but resolved with NTG/ASA.  EKG nondiagnostic and EKG normal.  Elev D-dimer also noted  Will check Ex MPI and Echo  Check lipid panel  CTA chest deferred to IM svc (case d/w TYLOR Daniel)    Essential hypertension  Med rx    Elevated d-dimer  CTA chest deferred to TYLOR Daniel      VTE Risk Mitigation     None          Thank you for your consult. I will follow-up with patient. Please contact us if you have any additional questions.    Danny Mason MD  Cardiology   [facility]    Addendum 540pm:  CTA Chest neg for PE  Echo normal  Ex MPI with inferior ischemia  Cath in am  Plavix load  Start atorva  Risks, benefits and alternatives of the catheterization procedure were discussed with the patient and his wife in attendance, which include but are not limited to: bleeding, infection, death, heart attack, arrhythmia, kidney failure, stroke, need for emergency surgery, etc.  Patient understands and and agrees to proceed.  Consent was placed on the chart.

## 2017-04-10 NOTE — PLAN OF CARE
04/10/17 1733   Discharge Assessment   Assessment Type Discharge Planning Assessment   Confirmed/corrected address and phone number on facesheet? Yes   Assessment information obtained from? Patient   If Healthcare Directive is received, is it scanned into Epic? no (comment)   Prior to hospitilization cognitive status: Alert/Oriented;No Deficits   Prior to hospitalization functional status: Independent   Current Functional Status: Independent   Arrived From home or self-care   Lives With spouse   Able to Return to Prior Arrangements yes   How many people do you have in your home that can help with your care after discharge? 1   Who are your caregiver(s) and their phone number(s)? (spouse, Karlene Lozano [phone])   Patient's perception of discharge disposition home or selfcare   Readmission Within The Last 30 Days no previous admission in last 30 days   Patient currently being followed by outpatient case management? No   Does the patient currently use HME? Yes   Patient currently receives private duty nursing? No   Patient currently receives any other outside agency services? No   Equipment Currently Used at Home CPAP   Do you have any problems affording any of your prescribed medications? No   Is the patient taking medications as prescribed? yes   Do you have any financial concerns preventing you from receiving the healthcare you need? No   Does the patient have transportation to healthcare appointments? Yes   Transportation Available car   On Dialysis? No   Does the patient receive services at the Coumadin Clinic? No   Are there any open cases? No   Discharge Plan A Home with family   Patient/Family In Agreement With Plan yes     [facility] [place] [address]  Phone: [phone] Fax: [phone]

## 2017-04-10 NOTE — SUBJECTIVE & OBJECTIVE
Past Medical History:   Diagnosis Date    Anxiety     GERD (gastroesophageal reflux disease)     Hypertension        Past Surgical History:   Procedure Laterality Date    HAND SURGERY      ROTATOR CUFF REPAIR      SHOULDER SURGERY         Review of patient's allergies indicates:  No Known Allergies    No current facility-administered medications on file prior to encounter.      Current Outpatient Prescriptions on File Prior to Encounter   Medication Sig    hydrochlorothiazide (HYDRODIURIL) 25 MG tablet Take 1 tablet (25 mg total) by mouth once daily.    buPROPion (WELLBUTRIN SR) 200 MG TbSR Take 100 mg by mouth 2 (two) times daily.    fluoxetine (PROZAC) 20 MG capsule     hydrocodone-acetaminophen 5-325mg (NORCO) 5-325 mg per tablet Take 1 tablet by mouth every 4 (four) hours as needed for Pain.    mupirocin (BACTROBAN) 2 % ointment     naproxen (NAPROSYN) 500 MG tablet Take 1 tablet (500 mg total) by mouth 2 (two) times daily with meals.    omeprazole (PRILOSEC) 40 MG capsule Take 40 mg by mouth once daily.    sulfamethoxazole-trimethoprim 800-160mg (BACTRIM DS) 800-160 mg Tab      Family History     None        Social History Main Topics    Smoking status: Never Smoker    Smokeless tobacco: Not on file    Alcohol use Not on file    Drug use: Not on file    Sexual activity: Not on file     Review of Systems   Constitutional: Negative.    HENT: Negative.    Eyes: Negative.    Respiratory: Positive for chest tightness and shortness of breath. Negative for cough, wheezing and stridor.    Cardiovascular: Positive for chest pain. Negative for palpitations and leg swelling.   Gastrointestinal: Negative.    Genitourinary: Negative.    Musculoskeletal: Negative.    Skin: Negative.    Neurological: Numbness: right arm.   Psychiatric/Behavioral: Negative.      Objective:     Vital Signs (Most Recent):  Temp: 98.2 °F (36.8 °C) (04/10/17 0800)  Pulse: 65 (04/10/17 0800)  Resp: 17 (04/10/17 0800)  BP: 118/68  (04/10/17 0800)  SpO2: 95 % (04/10/17 0800) Vital Signs (24h Range):  Temp:  [98.2 °F (36.8 °C)-98.6 °F (37 °C)] 98.2 °F (36.8 °C)  Pulse:  [54-80] 65  Resp:  [15-17] 17  SpO2:  [94 %-98 %] 95 %  BP: (118-141)/(60-88) 118/68     Weight: 123.2 kg (271 lb 8 oz)  Body mass index is 36.82 kg/(m^2).    Physical Exam   Constitutional: He is oriented to person, place, and time. He appears well-developed and well-nourished. No distress.   HENT:   Head: Normocephalic and atraumatic.   Eyes: EOM are normal. Pupils are equal, round, and reactive to light. Left eye exhibits no discharge.   Neck: Normal range of motion. Neck supple.   Cardiovascular: Normal rate and regular rhythm.    No murmur heard.  Pulmonary/Chest: Effort normal and breath sounds normal.   Abdominal: Soft. Bowel sounds are normal.   Musculoskeletal: Normal range of motion. He exhibits no edema, tenderness or deformity.   Neurological: He is alert and oriented to person, place, and time. No cranial nerve deficit.   Skin: Skin is warm and dry.   Psychiatric: He has a normal mood and affect.        Significant Labs: All pertinent labs within the past 24 hours have been reviewed.    Significant Imaging: I have reviewed all pertinent imaging results/findings within the past 24 hours.

## 2017-04-10 NOTE — ED PROVIDER NOTES
Encounter Date: 4/9/2017    SCRIBE #1 NOTE: I, Erum Bryant, am scribing for, and in the presence of,  Haris Mccollum MD. I have scribed the following portions of the note - Other sections scribed: ROS, HPI.       History     Chief Complaint   Patient presents with    Chest Pain     PT C/O CP THAT RADIATES DOWN LA, 2/10 PAIN. VSS, NO S/S OF ACUTE DISTRESS AT THIS TIME.     Review of patient's allergies indicates:  No Known Allergies  HPI Comments: CC: Chest Pain    HPI: Patient is a 47 y.o. M with a past medical history of Anxiety; GERD; and Hypertension who presents to the ED via EMS for evaluation of acute, intermittent L-sided chest pain radiating inbetween his shoulders and down the left arm with associated shortness of breath that began while driving PTA. Pain was initially severe (9/10) but is currently mild (2/10) after EMS administered Aspirin and NTG en route. Pain is not worse with inspiration or movement of the L arm. He states that symptoms do not feel like GERD. Patient denies palpitations, dizziness, weakness, leg swelling, diaphoresis, and/or abdominal pain. No PMHx of DM, HLD, and/or Anxiety. No prior cardiac evaluation. No FHx CAD. Patient is a non-smoker but reports occasional alcohol consumption.      The history is provided by the patient. No  was used.     Past Medical History:   Diagnosis Date    Anxiety     GERD (gastroesophageal reflux disease)     Hypertension      Past Surgical History:   Procedure Laterality Date    HAND SURGERY      ROTATOR CUFF REPAIR      SHOULDER SURGERY       No family history on file.  Social History   Substance Use Topics    Smoking status: Never Smoker    Smokeless tobacco: None    Alcohol use None     Review of Systems   Constitutional: Negative for chills, diaphoresis and fever.   HENT: Negative for sore throat.    Eyes: Negative for redness.   Respiratory: Positive for shortness of breath.    Cardiovascular: Positive for chest  pain (L-sided; radiating inbetween shoulders and down LUE). Negative for palpitations and leg swelling.   Gastrointestinal: Negative for abdominal pain, diarrhea, nausea and vomiting.   Genitourinary: Negative for dysuria.   Musculoskeletal: Negative for back pain.   Skin: Negative for rash.   Neurological: Negative for dizziness and weakness.       Physical Exam   Initial Vitals   BP Pulse Resp Temp SpO2   04/09/17 2008 04/09/17 2008 04/09/17 2008 04/09/17 2008 04/09/17 2008   120/88 80 15 98.3 °F (36.8 °C) 98 %     Physical Exam    Nursing note and vitals reviewed.  Constitutional: He appears well-developed and well-nourished. No distress.   HENT:   Head: Atraumatic.   Eyes: EOM are normal. Pupils are equal, round, and reactive to light.   Neck: Normal range of motion. Neck supple. No JVD present.   Cardiovascular: Normal rate, regular rhythm, normal heart sounds and intact distal pulses. Exam reveals no gallop and no friction rub.    No murmur heard.  Pulmonary/Chest: Breath sounds normal. No respiratory distress. He has no wheezes. He has no rhonchi. He has no rales. He exhibits no tenderness.   Abdominal: Soft. Bowel sounds are normal. He exhibits no distension and no mass. There is no tenderness. There is no rebound and no guarding.   Musculoskeletal: Normal range of motion.   Lymphadenopathy:     He has no cervical adenopathy.   Neurological: He is alert and oriented to person, place, and time. He has normal strength.   Skin: Skin is warm and dry.   Psychiatric: He has a normal mood and affect. Thought content normal.         ED Course   Procedures  Labs Reviewed   CBC W/ AUTO DIFFERENTIAL - Abnormal; Notable for the following:        Result Value    Hemoglobin 13.4 (*)     MCV 79 (*)     MCH 25.8 (*)     All other components within normal limits    Narrative:     PLEASE REVIEW ORDER START TIME BEFORE MARKING SPECIMEN  COLLECTED.   COMPREHENSIVE METABOLIC PANEL - Abnormal; Notable for the following:     BUN,  Bld 22 (*)     All other components within normal limits    Narrative:     PLEASE REVIEW ORDER START TIME BEFORE MARKING SPECIMEN  COLLECTED.   D DIMER, QUANTITATIVE - Abnormal; Notable for the following:     D-Dimer 0.88 (*)     All other components within normal limits    Narrative:     PLEASE REVIEW ORDER START TIME BEFORE MARKING SPECIMEN  COLLECTED.   PROTIME-INR    Narrative:     PLEASE REVIEW ORDER START TIME BEFORE MARKING SPECIMEN  COLLECTED.   TROPONIN I    Narrative:     PLEASE REVIEW ORDER START TIME BEFORE MARKING SPECIMEN  COLLECTED.   B-TYPE NATRIURETIC PEPTIDE    Narrative:     PLEASE REVIEW ORDER START TIME BEFORE MARKING SPECIMEN  COLLECTED.   MAGNESIUM    Narrative:     PLEASE REVIEW ORDER START TIME BEFORE MARKING SPECIMEN  COLLECTED.     EKG Readings: (Independently Interpreted)   Initial Reading: No STEMI. Rhythm: Sinus Bradycardia. Heart Rate: 59. Ectopy: No Ectopy. ST Segments: Normal ST Segments. T Waves: Normal.       X-Rays:   Independently Interpreted Readings:   Chest X-Ray: Normal heart size.  No infiltrates.  No acute abnormalities.          patient presents complaining of left sided chest pain radiating to the neck and left arm associated with dizziness and shortness of breath.  Initially severe and nearly resolved with aspirin and nitroglycerin given by EMS.  Initial workup shows no abnormalities.  As part of initial chest pain workup d-dimer was accidentally added to the order set.  Patient does not have any pleuritic chest pain, hypoxia, tachycardia, tachypnea, or shortness of breath at this time.  Patient has no PE or DVT risk factors.  Patient was not hypertensive, denies any drug use, and had equal pulses in all extremities and equal blood pressures in both arms.  I feel the likelihood of pulmonary embolism or aortic dissection is very low and CT the chest was not further required despite d-dimer being mildly elevated. Dicussed with Dr. Ozuna. Will obs for serial troponins and  obtain 2d cardiac echo and then cardiac stress if troponins normal.       Scribe Attestation:   Scribe #1: I performed the above scribed service and the documentation accurately describes the services I performed. I attest to the accuracy of the note.    Attending Attestation:           Physician Attestation for Scribe:  Physician Attestation Statement for Scribe #1: I, Haris Mccollum MD, reviewed documentation, as scribed by Erum Bryant in my presence, and it is both accurate and complete.                 ED Course     Clinical Impression:   CHest pain            Haris Mccollum MD  04/10/17 0642

## 2017-04-10 NOTE — PLAN OF CARE
04/10/17 1125   Discharge Assessment   Assessment Type Discharge Planning Assessment   Patient/Family In Agreement With Plan other (see comments)  (Attempted d/c assessment but pt off unti.)

## 2017-04-10 NOTE — PROGRESS NOTES
WRITTEN DISCHARGE INFORMATION:     Follow-up Information     Follow up with [facility] On 5/1/2017.    Why:  out patient services:  2:30 p.m. follow up from the hospital    Contact information:    [address], [place]   [phone]  [facility]                                                             Help at Home  After discharge for assistance [facility] On Call Nurse Care Line 24/7 assistance  [phone]     Thank you for choosing [facility] for your care.  Please answer any calls you may receive from [facility] we want to continue to support you as you manage your healthcare needs. [facility] is happy to have the opportunity to serve you.   Sincerely, Your [facility] Healthcare Manager is,  Deanna Boudreaux RN [facility] [phone]

## 2017-04-10 NOTE — NURSING
Patient arrived to the unit via wheel chair accompanied by nurse and spouse. Pt. Is AAOx4. Pt. Has telemetry monitor in place. Pt. Vital signs stable and found in flow sheets with complete patient assessment. Pt. Skin dry and intact. Pt. Has complaints of a headache but no signs of respiratory distress noted. Pt. Stable. Will continue to monitor.

## 2017-04-10 NOTE — SUBJECTIVE & OBJECTIVE
Past Medical History:   Diagnosis Date    Anxiety     GERD (gastroesophageal reflux disease)     Hypertension        Past Surgical History:   Procedure Laterality Date    HAND SURGERY      ROTATOR CUFF REPAIR      SHOULDER SURGERY         Review of patient's allergies indicates:  No Known Allergies    Scheduled Meds:   aspirin  325 mg Oral Daily    hydrochlorothiazide  25 mg Oral Daily    pantoprazole  40 mg Oral Daily     Continuous Infusions:   PRN Meds:.acetaminophen, aluminum-magnesium hydroxide-simethicone, nitroGLYCERIN, ondansetron      No current facility-administered medications on file prior to encounter.      Current Outpatient Prescriptions on File Prior to Encounter   Medication Sig    hydrochlorothiazide (HYDRODIURIL) 25 MG tablet Take 1 tablet (25 mg total) by mouth once daily.    buPROPion (WELLBUTRIN SR) 200 MG TbSR Take 100 mg by mouth 2 (two) times daily.    fluoxetine (PROZAC) 20 MG capsule     hydrocodone-acetaminophen 5-325mg (NORCO) 5-325 mg per tablet Take 1 tablet by mouth every 4 (four) hours as needed for Pain.    mupirocin (BACTROBAN) 2 % ointment     naproxen (NAPROSYN) 500 MG tablet Take 1 tablet (500 mg total) by mouth 2 (two) times daily with meals.    omeprazole (PRILOSEC) 40 MG capsule Take 40 mg by mouth once daily.    sulfamethoxazole-trimethoprim 800-160mg (BACTRIM DS) 800-160 mg Tab      Family History     None        Social History Main Topics    Smoking status: Never Smoker    Smokeless tobacco: Not on file    Alcohol use Not on file    Drug use: Not on file    Sexual activity: Not on file     Review of Systems   Constitution: Negative for chills, diaphoresis, fever, weakness and malaise/fatigue.   HENT: Negative for headaches and nosebleeds.    Eyes: Negative for blurred vision and double vision.   Cardiovascular: Positive for chest pain. Negative for claudication, cyanosis, dyspnea on exertion, leg swelling, orthopnea, palpitations, paroxysmal nocturnal  dyspnea and syncope.   Respiratory: Negative for cough, shortness of breath and wheezing.    Skin: Negative for dry skin and poor wound healing.   Musculoskeletal: Negative for back pain, joint swelling and myalgias.   Gastrointestinal: Negative for abdominal pain, nausea and vomiting.   Genitourinary: Negative for hematuria.   Neurological: Negative for dizziness, numbness and seizures.   Psychiatric/Behavioral: Negative for altered mental status and depression.     Objective:     Vital Signs (Most Recent):  Temp: 98.2 °F (36.8 °C) (04/10/17 0800)  Pulse: 65 (04/10/17 0800)  Resp: 17 (04/10/17 0800)  BP: 118/68 (04/10/17 0800)  SpO2: 95 % (04/10/17 0800) Vital Signs (24h Range):  Temp:  [98.2 °F (36.8 °C)-98.6 °F (37 °C)] 98.2 °F (36.8 °C)  Pulse:  [54-80] 65  Resp:  [15-17] 17  SpO2:  [94 %-98 %] 95 %  BP: (118-141)/(60-88) 118/68     Weight: 123.2 kg (271 lb 8 oz)  Body mass index is 36.82 kg/(m^2).    SpO2: 95 %  O2 Device (Oxygen Therapy): room air      Intake/Output Summary (Last 24 hours) at 04/10/17 0931  Last data filed at 04/10/17 0700   Gross per 24 hour   Intake               50 ml   Output                0 ml   Net               50 ml       Lines/Drains/Airways     Peripheral Intravenous Line                 Peripheral IV - Single Lumen 04/09/17 1954 Left Antecubital less than 1 day                Physical Exam   Constitutional: He is oriented to person, place, and time. He appears well-developed and well-nourished.   HENT:   Head: Normocephalic and atraumatic.   Eyes: Conjunctivae and EOM are normal. Pupils are equal, round, and reactive to light. No scleral icterus.   Neck: Neck supple. No JVD present. Carotid bruit is not present. No tracheal deviation present. No thyromegaly present.   Cardiovascular: Normal rate, regular rhythm, S1 normal, S2 normal and intact distal pulses.  Exam reveals no gallop and no friction rub.    No murmur heard.  Pulmonary/Chest: Effort normal and breath sounds normal. He  has no wheezes. He exhibits no tenderness.   Abdominal: Soft. Bowel sounds are normal. He exhibits no distension and no mass. There is no tenderness. There is no rebound and no guarding.   Musculoskeletal: He exhibits no edema.   Neurological: He is alert and oriented to person, place, and time. He has normal strength. No cranial nerve deficit.   Skin: Skin is warm and dry. No rash noted.   Psychiatric: He has a normal mood and affect. His behavior is normal.       Significant Labs:   CMP   Recent Labs  Lab 04/09/17 2043      K 4.0      CO2 28   GLU 85   BUN 22*   CREATININE 1.2   CALCIUM 9.1   PROT 6.5   ALBUMIN 3.8   BILITOT 0.4   ALKPHOS 61   AST 27   ALT 34   ANIONGAP 8   ESTGFRAFRICA >60   EGFRNONAA >60   , CBC   Recent Labs  Lab 04/09/17 2043   WBC 5.68   HGB 13.4*   HCT 41.2      , INR   Recent Labs  Lab 04/09/17  2043 04/10/17  0307   INR 1.0 1.0     Recent Labs  Lab 04/09/17  2043 04/10/17  0542   TROPONINI <0.006 <0.006     No results found for: CHOL  No results found for: HDL  No results found for: LDLCALC  No results found for: TRIG  No results found for: CHOLHDL      Significant Imaging:   CXR 4/9/17 (pers rev images) NAD    EKG   4/9/17 2016 SR 59, LVH

## 2017-04-11 VITALS
HEART RATE: 58 BPM | RESPIRATION RATE: 18 BRPM | OXYGEN SATURATION: 96 % | BODY MASS INDEX: 37.27 KG/M2 | TEMPERATURE: 98 F | DIASTOLIC BLOOD PRESSURE: 77 MMHG | SYSTOLIC BLOOD PRESSURE: 131 MMHG | WEIGHT: 275.19 LBS | HEIGHT: 72 IN

## 2017-04-11 LAB
ALBUMIN SERPL BCP-MCNC: 3.7 G/DL
ALP SERPL-CCNC: 59 U/L
ALT SERPL W/O P-5'-P-CCNC: 33 U/L
ANION GAP SERPL CALC-SCNC: 11 MMOL/L
AST SERPL-CCNC: 22 U/L
BASOPHILS # BLD AUTO: 0.03 K/UL
BASOPHILS NFR BLD: 0.5 %
BILIRUB SERPL-MCNC: 0.4 MG/DL
BUN SERPL-MCNC: 18 MG/DL
CALCIUM SERPL-MCNC: 8.9 MG/DL
CHLORIDE SERPL-SCNC: 104 MMOL/L
CO2 SERPL-SCNC: 26 MMOL/L
CREAT SERPL-MCNC: 1 MG/DL
DIFFERENTIAL METHOD: ABNORMAL
EOSINOPHIL # BLD AUTO: 0.3 K/UL
EOSINOPHIL NFR BLD: 4.2 %
ERYTHROCYTE [DISTWIDTH] IN BLOOD BY AUTOMATED COUNT: 13.9 %
EST. GFR  (AFRICAN AMERICAN): >60 ML/MIN/1.73 M^2
EST. GFR  (NON AFRICAN AMERICAN): >60 ML/MIN/1.73 M^2
GLUCOSE SERPL-MCNC: 95 MG/DL
HCT VFR BLD AUTO: 44 %
HGB BLD-MCNC: 14.5 G/DL
LYMPHOCYTES # BLD AUTO: 1.6 K/UL
LYMPHOCYTES NFR BLD: 24 %
MCH RBC QN AUTO: 26.1 PG
MCHC RBC AUTO-ENTMCNC: 33 %
MCV RBC AUTO: 79 FL
MONOCYTES # BLD AUTO: 0.6 K/UL
MONOCYTES NFR BLD: 8.5 %
NEUTROPHILS # BLD AUTO: 4.1 K/UL
NEUTROPHILS NFR BLD: 62.6 %
PLATELET # BLD AUTO: 217 K/UL
PMV BLD AUTO: 9.8 FL
POTASSIUM SERPL-SCNC: 4 MMOL/L
PROT SERPL-MCNC: 6.4 G/DL
RBC # BLD AUTO: 5.56 M/UL
SODIUM SERPL-SCNC: 141 MMOL/L
WBC # BLD AUTO: 6.47 K/UL

## 2017-04-11 PROCEDURE — 25000003 PHARM REV CODE 250: Performed by: INTERNAL MEDICINE

## 2017-04-11 PROCEDURE — 99152 MOD SED SAME PHYS/QHP 5/>YRS: CPT

## 2017-04-11 PROCEDURE — 96360 HYDRATION IV INFUSION INIT: CPT

## 2017-04-11 PROCEDURE — 96366 THER/PROPH/DIAG IV INF ADDON: CPT

## 2017-04-11 PROCEDURE — C1769 GUIDE WIRE: HCPCS

## 2017-04-11 PROCEDURE — 80053 COMPREHEN METABOLIC PANEL: CPT

## 2017-04-11 PROCEDURE — 63600175 PHARM REV CODE 636 W HCPCS

## 2017-04-11 PROCEDURE — 25000003 PHARM REV CODE 250: Performed by: EMERGENCY MEDICINE

## 2017-04-11 PROCEDURE — G0378 HOSPITAL OBSERVATION PER HR: HCPCS

## 2017-04-11 PROCEDURE — 93458 L HRT ARTERY/VENTRICLE ANGIO: CPT | Mod: 26,,, | Performed by: INTERNAL MEDICINE

## 2017-04-11 PROCEDURE — 36415 COLL VENOUS BLD VENIPUNCTURE: CPT

## 2017-04-11 PROCEDURE — 99152 MOD SED SAME PHYS/QHP 5/>YRS: CPT | Mod: ,,, | Performed by: INTERNAL MEDICINE

## 2017-04-11 PROCEDURE — 25000003 PHARM REV CODE 250

## 2017-04-11 PROCEDURE — 85025 COMPLETE CBC W/AUTO DIFF WBC: CPT

## 2017-04-11 RX ORDER — ATROPINE SULFATE 0.1 MG/ML
0.5 INJECTION INTRAVENOUS
Status: DISCONTINUED | OUTPATIENT
Start: 2017-04-11 | End: 2017-04-11 | Stop reason: HOSPADM

## 2017-04-11 RX ORDER — ACETAMINOPHEN 325 MG/1
650 TABLET ORAL EVERY 4 HOURS PRN
Status: DISCONTINUED | OUTPATIENT
Start: 2017-04-11 | End: 2017-04-11 | Stop reason: HOSPADM

## 2017-04-11 RX ORDER — MORPHINE SULFATE 10 MG/ML
2 INJECTION INTRAMUSCULAR; INTRAVENOUS; SUBCUTANEOUS EVERY 10 MIN PRN
Status: DISCONTINUED | OUTPATIENT
Start: 2017-04-11 | End: 2017-04-11 | Stop reason: HOSPADM

## 2017-04-11 RX ORDER — SODIUM CHLORIDE 9 MG/ML
5 INJECTION, SOLUTION INTRAVENOUS CONTINUOUS
Status: DISCONTINUED | OUTPATIENT
Start: 2017-04-11 | End: 2017-04-11 | Stop reason: HOSPADM

## 2017-04-11 RX ORDER — RAMELTEON 8 MG/1
8 TABLET ORAL NIGHTLY PRN
Status: DISCONTINUED | OUTPATIENT
Start: 2017-04-11 | End: 2017-04-11 | Stop reason: HOSPADM

## 2017-04-11 RX ORDER — ONDANSETRON 2 MG/ML
4 INJECTION INTRAMUSCULAR; INTRAVENOUS EVERY 12 HOURS PRN
Status: DISCONTINUED | OUTPATIENT
Start: 2017-04-11 | End: 2017-04-11 | Stop reason: HOSPADM

## 2017-04-11 RX ORDER — HYDROCODONE BITARTRATE AND ACETAMINOPHEN 5; 325 MG/1; MG/1
1 TABLET ORAL EVERY 4 HOURS PRN
Status: DISCONTINUED | OUTPATIENT
Start: 2017-04-11 | End: 2017-04-11 | Stop reason: HOSPADM

## 2017-04-11 RX ORDER — ASPIRIN 81 MG/1
81 TABLET ORAL DAILY
Refills: 0 | COMMUNITY
Start: 2017-04-11 | End: 2023-05-18

## 2017-04-11 RX ORDER — ATORVASTATIN CALCIUM 40 MG/1
20 TABLET, FILM COATED ORAL NIGHTLY
Qty: 30 TABLET | Refills: 2 | Status: SHIPPED | OUTPATIENT
Start: 2017-04-11 | End: 2023-05-18

## 2017-04-11 RX ADMIN — HYDROCHLOROTHIAZIDE 25 MG: 25 TABLET ORAL at 09:04

## 2017-04-11 RX ADMIN — CLOPIDOGREL BISULFATE 75 MG: 75 TABLET ORAL at 09:04

## 2017-04-11 RX ADMIN — SODIUM CHLORIDE: 0.9 INJECTION, SOLUTION INTRAVENOUS at 01:04

## 2017-04-11 RX ADMIN — ASPIRIN 81 MG: 81 TABLET, COATED ORAL at 09:04

## 2017-04-11 RX ADMIN — PANTOPRAZOLE SODIUM 40 MG: 40 TABLET, DELAYED RELEASE ORAL at 09:04

## 2017-04-11 RX ADMIN — RAMELTEON 8 MG: 8 TABLET, FILM COATED ORAL at 01:04

## 2017-04-11 NOTE — PROGRESS NOTES
TN informed OBS nurse pt ready for d/c from [facility]..Deanna Boudreaux, RN, BSN, [facility]  4/11/2017

## 2017-04-11 NOTE — PLAN OF CARE
Problem: Fall Risk (Adult)  Intervention: Monitor/Assist with Self Care    04/10/17 1930 04/11/17 0408   Functional Level Current   Ambulation 0 - independent --    Transferring 0 - independent --    Toileting 0 - independent --    Bathing 0 - independent --    Dressing 0 - independent --    Eating 0 - independent --    Communication 0 - understands/communicates without difficulty --    Swallowing 0 - swallows foods/liquids without difficulty --    Daily Care Interventions   Self-Care Promotion independence encouraged --    Activity   Activity Assistance Provided --  independent       Intervention: Reduce Risk/Promote Restraint Free Environment    04/10/17 0428 04/10/17 0745   Safety Interventions   Environmental Safety Modification clutter free environment maintained;lighting adjusted;room near unit station;room organization consistent --    Safety Interventions   Safety Precautions --  emergency equipment at bedside       Intervention: Review Medications/Identify Contributors to Fall Risk    04/11/17 0540   Safety Interventions   Medication Review/Management medications reviewed       Intervention: Patient Rounds    04/11/17 0540   Safety Interventions   Patient Rounds bed in low position;bed wheels locked;call light in reach;clutter free environment maintained;visualized patient;toileting offered;placement of personal items at bedside;ID band on       Intervention: Safety Promotion/Fall Prevention    04/11/17 0408   Safety Interventions   Safety Promotion/Fall Prevention lighting adjusted;medications reviewed;room near unit station;side rails raised x 2       Intervention: Safety Precautions    04/10/17 0745   Safety Interventions   Safety Precautions emergency equipment at bedside         Goal: Absence of Falls  Patient will demonstrate the desired outcomes by discharge/transition of care.     04/11/17 0540   Fall Risk (Adult)   Absence of Falls making progress toward outcome         Problem: Patient Care  Overview  Goal: Plan of Care Review    04/11/17 0540   Coping/Psychosocial   Plan Of Care Reviewed With patient         Problem: Cardiac: ACS (Acute Coronary Syndrome) (Adult)  Intervention: Prevent/Manage Thrombi/Emboli    04/10/17 1930   Safety Interventions   Bleeding Precautions blood pressure closely monitored   OTHER   VTE Required Core Measure Pharmacological prophylaxis initiated/maintained       Intervention: Support Psychosocial Response to Life-changing Event/Hospitalization    04/11/17 0540   Coping/Psychosocial Interventions   Supportive Measures active listening utilized   Psychosocial Support   Family/Support System Care involvement promoted;presence promoted         Goal: Signs and Symptoms of Listed Potential Problems Will be Absent, Minimized or Managed (Cardiac: ACS)  Signs and symptoms of listed potential problems will be absent, minimized or managed by discharge/transition of care (reference Cardiac: ACS (Acute Coronary Syndrome) (Adult) CPG).     04/10/17 0428 04/11/17 0540   Cardiac: ACS (Acute Coronary Syndrome)   Problems Assessed (Acute Coronary Syndrome) --  all   Problems Present (Acute Coronary Syndrome) chest pain (angina);situational response --

## 2017-04-11 NOTE — PROGRESS NOTES
WRITTEN DISCHARGE INFORMATION:  Follow-up Information     Follow up with Rupert Alcantar MD On 4/25/2017.    Specialty:  Family Medicine    Why:  out patient services:  11:15 a.m. follow up from the  hospital     Contact information:    [address]  [address]  [place] [address]  [phone]          Follow up with Danny Mason MD On 4/20/2017.    Specialties:  Cardiology, INTERVENTIONAL CARDIOLOGY    Why:  out patient services:  9;40 a.m. follow up from the hospital    Contact information:    [address]  [address]  [place] [address]  [phone]                                                                Help at Home  After discharge for assistance [facility] On Call Nurse [facility] Line 24/7 assistance  [phone]     Thank you for choosing [facility] for your care.  Please answer any calls you may receive from [facility] we want to continue to support you as you manage your healthcare needs. [facility] is happy to have the opportunity to serve you.   Sincerely, Your [facility] Healthcare Manager is,  Deanna Boudreaux RN [facility] [phone]

## 2017-04-11 NOTE — DISCHARGE SUMMARY
"[facility] Hospital Medicine  Discharge Summary      Patient Name: Dean Lozano  MRN: [id number]  Admission Date: 4/9/2017  Hospital Length of Stay: 0 days  Discharge Date and Time:  04/11/2017 2:28 PM  Attending Physician: Miriam Leal MD   Discharging Provider: ARANZA Howard  Primary Care Provider: Ayah De Leon MD      HPI:   Dean Lozano is a 46 yo male with significant history for hypertension and depression (no longer on wellbutrin or prozac who is admitted to observation for acute sudden left chest pain radiating down left arm that occurred at 630 pm yesterday while driving. Patient is a . Symptoms associated with shortness of breath but denies headaches, dizziness, change in vision, palpitations, diaphoresis, orthopnea, PND, abdominal pain, nausea, or vomiting, or extremities weakness. Chest pain would eased up but return shortly with more intense pain each time. Patient return home where he called EMS. Received ASA, nitro SL x 3, and nitro paste before pain improved. Denies recent sick contacts, travel, or injury. Takes aleve as needed (last dose 3 weeks ago). No new physical limitation in the past month. Prior to event, in usual state of health. No recent heavy exercise. Denies smoking or illicit drug use. Occasionally drinks alcohol. No family of MI/SCD. Report "cholesterol always good."    In ED, EKG SB no ischemic changes and troponin negative x 2. D dimer positive 0.88. CXR clear. Stress test ordered.         Procedure(s) (LRB):  HEART CATH-LEFT not before 9am (Left)      Indwelling Lines/Drains at time of discharge:   Lines/Drains/Airways          No matching active lines, drains, or airways        Hospital Course:   Patient admitted for chest pain trops negative, due to risk factors Cardiology consulted, NST performed which was found to be abnormal NST inferior ischemia, LHC performed showing normal coronaries, medical management; statin for " abnormal lipid panel (elevated triglycerides). stable for discharge.     Consults:   Consults         Status Ordering Provider     Inpatient consult to Cardiology  Once     Provider:  Danny Mason MD    Completed DIANA AZULH          Significant Diagnostic Studies: Labs:   CMP   Recent Labs  Lab 04/09/17 2043 04/11/17  0459    141   K 4.0 4.0    104   CO2 28 26   GLU 85 95   BUN 22* 18   CREATININE 1.2 1.0   CALCIUM 9.1 8.9   PROT 6.5 6.4   ALBUMIN 3.8 3.7   BILITOT 0.4 0.4   ALKPHOS 61 59   AST 27 22   ALT 34 33   ANIONGAP 8 11   ESTGFRAFRICA >60 >60   EGFRNONAA >60 >60   , CBC   Recent Labs  Lab 04/09/17 2043 04/11/17  0459   WBC 5.68 6.47   HGB 13.4* 14.5   HCT 41.2 44.0    217    and Troponin   Recent Labs  Lab 04/10/17  1409   TROPONINI <0.006       Pending Diagnostic Studies:     Procedure Component Value Units Date/Time    NM Myocardial Perfusion Spect Multi Exer [[id number]] Resulted:  04/10/17 1004    Order Status:  Sent Lab Status:  In process Updated:  04/10/17 1324        Final Active Diagnoses:    Diagnosis Date Noted POA    PRINCIPAL PROBLEM:  Chest pain [R07.9] 04/10/2017 Yes    Gastroesophageal reflux disease without esophagitis [K21.9] 04/10/2017 Unknown    Elevated d-dimer [R79.89] 04/10/2017 Yes    Essential hypertension [I10] 09/01/2015 Yes     Chronic    Mild recurrent major depression [F33.0] 09/01/2015 Yes     Chronic      Problems Resolved During this Admission:    Diagnosis Date Noted Date Resolved POA      No new Assessment & Plan notes have been filed under this hospital service since the last note was generated.  Service: Hospital Medicine      Discharged Condition: stable    Disposition: Home or Self Care    Follow Up:  Follow-up Information     Follow up with Di Celeste On 5/1/2017.    Why:  out patient services:  2:30 p.m. follow up from the hospital    Contact information:    [address] [place]   [phone]  hospitals FAM[facility]  Medicine        Patient Instructions:     Diet general   Order Specific Question Answer Comments   Na restriction, if any: 2gNa      Activity as tolerated       Medications:  Reconciled Home Medications:   Current Discharge Medication List      START taking these medications    Details   aspirin (ECOTRIN) 81 MG EC tablet Take 1 tablet (81 mg total) by mouth once daily.  Refills: 0      atorvastatin (LIPITOR) 40 MG tablet Take 0.5 tablets (20 mg total) by mouth every evening.  Qty: 30 tablet, Refills: 2         CONTINUE these medications which have NOT CHANGED    Details   esomeprazole (NEXIUM) 40 MG capsule Take 40 mg by mouth before breakfast.      hydrochlorothiazide (HYDRODIURIL) 25 MG tablet Take 1 tablet (25 mg total) by mouth once daily.  Qty: 30 tablet, Refills: 5    Associated Diagnoses: Essential hypertension      buPROPion (WELLBUTRIN SR) 200 MG TbSR Take 100 mg by mouth 2 (two) times daily.      fluoxetine (PROZAC) 20 MG capsule       hydrocodone-acetaminophen 5-325mg (NORCO) 5-325 mg per tablet Take 1 tablet by mouth every 4 (four) hours as needed for Pain.  Qty: 20 tablet, Refills: 0      mupirocin (BACTROBAN) 2 % ointment       naproxen (NAPROSYN) 500 MG tablet Take 1 tablet (500 mg total) by mouth 2 (two) times daily with meals.  Qty: 14 tablet, Refills: 0      omeprazole (PRILOSEC) 40 MG capsule Take 40 mg by mouth once daily.      sulfamethoxazole-trimethoprim 800-160mg (BACTRIM DS) 800-160 mg Tab            Time spent on the discharge of patient: 45 minutes        IRVIN Greenwood, FNP-C  PA# [id number]  NPI# [id number]  Hospitalist - Department of Hospital Medicine  [address] [place] [address]  Office [phone]; Pager [phone]

## 2017-04-11 NOTE — OP NOTE
[facility]  Brief Operative Note     SUMMARY     Surgery Date: 4/11/2017     Surgeon(s) and Role:     * Danny Mason MD - Primary    Assisting Surgeon: None    Pre-op Diagnosis:  Chest pain [R07.9]  Abnormal nuclear stress test [R94.39]    Post-op Diagnosis:  Normal cors    Procedure(s) (LRB):  HEART CATH-LEFT not before 9am (Left), cor angio, R rad vasband    Anesthesia: Choice    Description of the findings of the procedure: see below    Findings/Key Components:  LVEDP: 5mmHg    Dominance: Right  LM: Normal  LAD: Normal  Ramus: Normal  LCx: Normal  RCA: dom, Normal    Hemostasis:  R Radial band    Impression:  CP with abnl MPI  Normal cors/LV fxn  R Radial vasband    Plan:  Med rx  Home today  Follow up with Dr. Mason 1-2 weeks    Estimated Blood Loss: <50cc         Specimens: None

## 2017-04-11 NOTE — PLAN OF CARE
04/11/17 1725   Discharge Reassessment   Assessment Type Final Discharge Note   Can the patient answer the patient profile reliably? Yes, cognitively intact   How does the patient rate their overall health at the present time? Good   Describe the patient's ability to walk at the present time. No restrictions   How often would a person be available to care for the patient? Never   Number of comorbid conditions (as recorded on the chart) One   During the past month, has the patient often been bothered by feeling down, depressed or hopeless? No   During the past month, has the patient often been bothered by little interest or pleasure in doing things? No   Discharge plan remains the same: No   Provided patient/caregiver education on the expected discharge date and the discharge plan Yes   Discharge Plan A Home with family   Discharge Plan B Home with family   Change in patient condition or support system No   Patient choice form signed by patient/caregiver N/A   Explained to the the patient/caregiver why the discharge planned changed: No   Involved the patient/caregiver in establishing a new discharge plan: Yes

## 2017-04-19 ENCOUNTER — OFFICE VISIT (OUTPATIENT)
Dept: CARDIOLOGY | Facility: CLINIC | Age: 47
End: 2017-04-19
Payer: MEDICAID

## 2017-04-19 VITALS
RESPIRATION RATE: 15 BRPM | WEIGHT: 266.56 LBS | HEIGHT: 72 IN | OXYGEN SATURATION: 96 % | BODY MASS INDEX: 36.1 KG/M2 | DIASTOLIC BLOOD PRESSURE: 85 MMHG | HEART RATE: 75 BPM | SYSTOLIC BLOOD PRESSURE: 136 MMHG

## 2017-04-19 DIAGNOSIS — I10 ESSENTIAL HYPERTENSION: Chronic | ICD-10-CM

## 2017-04-19 DIAGNOSIS — E78.5 HYPERLIPIDEMIA, UNSPECIFIED HYPERLIPIDEMIA TYPE: ICD-10-CM

## 2017-04-19 DIAGNOSIS — R07.9 CHEST PAIN, UNSPECIFIED TYPE: Primary | ICD-10-CM

## 2017-04-19 DIAGNOSIS — E66.9 NON MORBID OBESITY, UNSPECIFIED OBESITY TYPE: ICD-10-CM

## 2017-04-19 PROCEDURE — 99999 PR PBB SHADOW E&M-EST. PATIENT-LVL III: CPT | Mod: PBBFAC,,, | Performed by: INTERNAL MEDICINE

## 2017-04-19 PROCEDURE — 99213 OFFICE O/P EST LOW 20 MIN: CPT | Mod: PBBFAC | Performed by: INTERNAL MEDICINE

## 2017-04-19 PROCEDURE — 99214 OFFICE O/P EST MOD 30 MIN: CPT | Mod: S$PBB,,, | Performed by: INTERNAL MEDICINE

## 2017-04-19 NOTE — PROGRESS NOTES
CARDIOVASCULAR PROGRESS NOTE    REASON FOR CONSULT:   Dean Lozano is a 47 y.o. male who presents for follow up of recent hosp for CP, cath.    PCP: Ortiz  HISTORY OF PRESENT ILLNESS:   The patient returns for follow-up of his recent hospitalization and cardiac catheterization.  He is accompanied by his wife to today's visit.  He's had no recurrence of chest pain.  He denies dyspnea, palpitations, lightheadedness, dizziness, or syncope.  There's been no PND, orthopnea, or lower extremity edema.  He denies melena, hematuria, or claudicant symptoms.  There've been no complications related to his right radial access site.    CARDIOVASCULAR HISTORY:   none    PAST MEDICAL HISTORY:     Past Medical History:   Diagnosis Date    Anxiety     GERD (gastroesophageal reflux disease)     Hypertension        PAST SURGICAL HISTORY:     Past Surgical History:   Procedure Laterality Date    HAND SURGERY      ROTATOR CUFF REPAIR      SHOULDER SURGERY         ALLERGIES AND MEDICATION:   Review of patient's allergies indicates:  No Known Allergies  Previous Medications    ASPIRIN (ECOTRIN) 81 MG EC TABLET    Take 1 tablet (81 mg total) by mouth once daily.    ATORVASTATIN (LIPITOR) 40 MG TABLET    Take 0.5 tablets (20 mg total) by mouth every evening.    ESOMEPRAZOLE (NEXIUM) 40 MG CAPSULE    Take 40 mg by mouth before breakfast.    HYDROCHLOROTHIAZIDE (HYDRODIURIL) 25 MG TABLET    Take 1 tablet (25 mg total) by mouth once daily.    HYDROCODONE-ACETAMINOPHEN 5-325MG (NORCO) 5-325 MG PER TABLET    Take 1 tablet by mouth every 4 (four) hours as needed for Pain.    NAPROXEN (NAPROSYN) 500 MG TABLET    Take 1 tablet (500 mg total) by mouth 2 (two) times daily with meals.    OMEPRAZOLE (PRILOSEC) 40 MG CAPSULE    Take 40 mg by mouth once daily.    SULFAMETHOXAZOLE-TRIMETHOPRIM 800-160MG (BACTRIM DS) 800-160 MG TAB           SOCIAL HISTORY:     Social History     Social History    Marital status:      Spouse name: N/A     Number of children: N/A    Years of education: N/A     Occupational History    Not on file.     Social History Main Topics    Smoking status: Never Smoker    Smokeless tobacco: Not on file    Alcohol use Not on file    Drug use: Not on file    Sexual activity: Not on file     Other Topics Concern    Not on file     Social History Narrative       FAMILY HISTORY:   History reviewed. No pertinent family history.    REVIEW OF SYSTEMS:   Review of Systems   Constitutional: Negative for chills, diaphoresis and fever.   HENT: Negative for nosebleeds.    Eyes: Negative for blurred vision, double vision and photophobia.   Respiratory: Negative for hemoptysis, shortness of breath and wheezing.    Cardiovascular: Negative for chest pain, palpitations, orthopnea, claudication, leg swelling and PND.   Gastrointestinal: Negative for abdominal pain, blood in stool, heartburn, melena, nausea and vomiting.   Genitourinary: Negative for flank pain and hematuria.   Musculoskeletal: Negative for falls, myalgias and neck pain.   Skin: Negative for rash.   Neurological: Negative for dizziness, seizures, loss of consciousness, weakness and headaches.   Endo/Heme/Allergies: Negative for polydipsia. Does not bruise/bleed easily.   Psychiatric/Behavioral: Negative for depression and memory loss. The patient is not nervous/anxious.        PHYSICAL EXAM:     Vitals:    04/19/17 1454   BP: 136/85   Pulse: 75   Resp: 15    Body mass index is 36.15 kg/(m^2).  Weight: 120.9 kg (266 lb 8.6 oz)   Height: 6' (182.9 cm)     Physical Exam   Constitutional: He is oriented to person, place, and time. He appears well-developed and well-nourished. He is cooperative.  Non-toxic appearance. No distress.   HENT:   Head: Normocephalic and atraumatic.   Eyes: Conjunctivae and EOM are normal. Pupils are equal, round, and reactive to light. No scleral icterus.   Neck: Trachea normal and normal range of motion. Neck supple. Normal carotid pulses and no  JVD present. Carotid bruit is not present. No rigidity. No edema present. No thyromegaly present.   Cardiovascular: Normal rate, regular rhythm, S1 normal and S2 normal.  PMI is not displaced.  Exam reveals no gallop and no friction rub.    No murmur heard.  Pulses:       Carotid pulses are 2+ on the right side, and 2+ on the left side.       Radial pulses are 2+ on the right side   R radial access site c/d/i, no hematoma/ecchymosis   Pulmonary/Chest: Effort normal and breath sounds normal. No respiratory distress. He has no wheezes. He has no rales. He exhibits no tenderness.   Abdominal: Soft. Bowel sounds are normal. He exhibits no distension and no mass. There is no hepatosplenomegaly. There is no tenderness.   obese   Musculoskeletal: He exhibits no edema or tenderness.   Feet:   Right Foot:   Skin Integrity: Negative for ulcer.   Left Foot:   Skin Integrity: Negative for ulcer.   Neurological: He is alert and oriented to person, place, and time. No cranial nerve deficit.   Skin: Skin is warm and dry. No rash noted. No erythema.   Psychiatric: He has a normal mood and affect. His speech is normal and behavior is normal.   Vitals reviewed.      DATA:   EKG: (personally reviewed tracing)  4/9/17 SR 59, LVH    Laboratory:  CBC:    Recent Labs  Lab 04/09/17 2043 04/11/17  0459   WHITE BLOOD CELL COUNT 5.68 6.47   HEMOGLOBIN 13.4 L 14.5   HEMATOCRIT 41.2 44.0   PLATELETS 228 217       CHEMISTRIES:    Recent Labs  Lab 04/09/17 2043 04/11/17  0459   GLUCOSE 85 95   SODIUM 140 141   POTASSIUM 4.0 4.0   BUN BLD 22 H 18   CREATININE 1.2 1.0   EGFR IF  >60 >60   EGFR IF NON- >60 >60   CALCIUM 9.1 8.9   MAGNESIUM 2.0  --        CARDIAC BIOMARKERS:    Recent Labs  Lab 04/09/17  2043 04/10/17  0542 04/10/17  1409   TROPONIN I <0.006 <0.006 <0.006       COAGS:    Recent Labs  Lab 04/09/17  2043 04/10/17  0307   INR 1.0 1.0       LIPIDS/LFTS:    Recent Labs  Lab 04/09/17  2043 04/10/17  0542  04/11/17  0459   CHOLESTEROL  --  157  --    TRIGLYCERIDES  --  262 H  --    HDL  --  22 L  --    LDL CHOLESTEROL  --  82.6  --    NON-HDL CHOLESTEROL  --  135  --    AST 27  --  22   ALT 34  --  33       Cardiovascular Testing:  Cath 4/11/17  LVEDP: 5mmHg  Dominance: Right  LM: Normal  LAD: Normal  Ramus: Normal  LCx: Normal  RCA: dom, Normal  Hemostasis:  R Radial band  Impression:  CP with abnl MPI  Normal cors/LV fxn  R Radial vasband  Plan:  Med rx  Home today  Follow up with Dr. Mason 1-2 weeks    Echo 4/10/17    1 - Normal left ventricular systolic function (EF 55-60%).  No diagnostic regional wall motion abnormalities.     2 - Eccentric hypertrophy.     3 - Trivial mitral regurgitation.     4 - Trivial tricuspid regurgitation.     5 - The estimated PA systolic pressure is 28 mmHg.     ASSESSMENT:   # CP without recurrence.  Cath normal.  # HTN, controlled  # HLP, on statin  # BMI 36    PLAN:   Cont med rx  Diet/exercise/weight loss  RTC prn  Follow up planned with PCP      Danny Mason MD, FACC

## 2017-04-19 NOTE — MR AVS SNAPSHOT
[facility] - Cardiology  [address] [facility] [place] [address]  Phone: [phone]                  Dean Lozano   2017 3:00 PM   Office Visit    Description:  Male : 1970   Provider:  Danny Mason MD   Department:  [facility] - Cardiology           Reason for Visit     Hospital Follow Up                To Do List           Goals (5 Years of Data)     None      [facility]     [facility] Nurse Care Line -  Assistance  Unless otherwise directed by your provider, please contact [facility], our nurse care line that is available for  assistance.     Registered nurses in the [facility] provide: appointment scheduling, clinical advisement, health education, and other advisory services.  Call: [phone] (toll free)               Medications           Message regarding Medications     Verify the changes and/or additions to your medication regime listed below are the same as discussed with your clinician today.  If any of these changes or additions are incorrect, please notify your healthcare provider.        STOP taking these medications     buPROPion (WELLBUTRIN SR) 200 MG TbSR Take 100 mg by mouth 2 (two) times daily.    fluoxetine (PROZAC) 20 MG capsule     mupirocin (BACTROBAN) 2 % ointment            Verify that the below list of medications is an accurate representation of the medications you are currently taking.  If none reported, the list may be blank. If incorrect, please contact your healthcare provider. Carry this list with you in case of emergency.           Current Medications     aspirin (ECOTRIN) 81 MG EC tablet Take 1 tablet (81 mg total) by mouth once daily.    atorvastatin (LIPITOR) 40 MG tablet Take 0.5 tablets (20 mg total) by mouth every evening.    esomeprazole (NEXIUM) 40 MG capsule Take 40 mg by mouth before breakfast.    hydrochlorothiazide (HYDRODIURIL) 25 MG tablet Take 1 tablet (25 mg total) by mouth once daily.     naproxen (NAPROSYN) 500 MG tablet Take 1 tablet (500 mg total) by mouth 2 (two) times daily with meals.    omeprazole (PRILOSEC) 40 MG capsule Take 40 mg by mouth once daily.    sulfamethoxazole-trimethoprim 800-160mg (BACTRIM DS) 800-160 mg Tab     hydrocodone-acetaminophen 5-325mg (NORCO) 5-325 mg per tablet Take 1 tablet by mouth every 4 (four) hours as needed for Pain.           Clinical Reference Information           Your Vitals Were     BP Pulse Resp Height Weight SpO2    136/85 75 15 6' (1.829 m) 120.9 kg (266 lb 8.6 oz) 96%    BMI                36.15 kg/m2          Blood Pressure          Most Recent Value    BP  136/85      Allergies as of 4/19/2017     No Known Allergies      Immunizations Administered on Date of Encounter - 4/19/2017     None      Language Assistance Services     ATTENTION: Language assistance services are available, free of charge. Please call [phone].      ATENCIÓN: Si habla renardañol, tiene a carmen disposición servicios gratuitos de asistencia lingüística. Llame al [phone].     [facility] Ý: N?u b?n nói Ti?ng Vi?t, có các d?ch v? h? tr? ngôn ng? mi?n phí dành cho b?n. G?i s? [phone].         [facility] complies with applicable Federal civil rights laws and does not discriminate on the basis of race, color, national origin, age, disability, or sex.

## 2019-07-14 ENCOUNTER — HOSPITAL ENCOUNTER (EMERGENCY)
Facility: HOSPITAL | Age: 49
Discharge: HOME | End: 2019-07-14
Attending: EMERGENCY MEDICINE
Payer: MEDICAID

## 2019-07-14 VITALS
DIASTOLIC BLOOD PRESSURE: 90 MMHG | TEMPERATURE: 99 F | HEIGHT: 72 IN | OXYGEN SATURATION: 100 % | SYSTOLIC BLOOD PRESSURE: 150 MMHG | HEART RATE: 74 BPM | BODY MASS INDEX: 34.13 KG/M2 | RESPIRATION RATE: 18 BRPM | WEIGHT: 252 LBS

## 2019-07-14 DIAGNOSIS — K64.8 INTERNAL HEMORRHOID, BLEEDING: Primary | ICD-10-CM

## 2019-07-14 PROCEDURE — 99284 EMERGENCY DEPT VISIT MOD MDM: CPT

## 2019-07-14 RX ORDER — DOCUSATE SODIUM 100 MG/1
100 CAPSULE, LIQUID FILLED ORAL 2 TIMES DAILY
Qty: 60 CAPSULE | Refills: 0 | Status: SHIPPED | OUTPATIENT
Start: 2019-07-14 | End: 2023-05-18

## 2019-07-14 RX ORDER — PHENTERMINE HYDROCHLORIDE 37.5 MG/1
37.5 CAPSULE ORAL EVERY MORNING
COMMUNITY
End: 2023-05-18

## 2019-07-14 RX ORDER — HYDROCORTISONE ACETATE 25 MG/1
25 SUPPOSITORY RECTAL 2 TIMES DAILY
Qty: 14 SUPPOSITORY | Refills: 0 | Status: SHIPPED | OUTPATIENT
Start: 2019-07-14 | End: 2019-07-21

## 2019-07-14 NOTE — ED TRIAGE NOTES
Patient reports seeing bright red blood when wiping today. Pt was dx with hemorrhoids on 7/9 and has been using preparation h and lidocaine topically. States he recently started taking phentermine for weight loss on 6.24

## 2019-07-15 NOTE — ED PROVIDER NOTES
Encounter Date: 7/14/2019       History     Chief Complaint   Patient presents with    Rectal Bleeding     tuesday went to urgent care on tuesday for hemroids, today after using bathroom wiped and saw bright red blood; pain, burning. tried sitz bath. denies weakness, dizzy, sob, cp     49-year-old male with history of anxiety, GERD, hypertension presents complaining of bright red blood per rectum.  Only 1 episode today.  Mostly on toilet tissue with wiping.  Patient was diagnosed this past Tuesday with hemorrhoids at an urgent care.  States he has been using Sitz baths and a cream that is a combination of lidocaine and hydrocortisone.  No abdominal pain. No fever.  Patient states the hemorrhoid problem started after prolonged straining week before.  No nausea vomiting. Patient is currently taking over-the-counter stool softener once daily.        Review of patient's allergies indicates:  No Known Allergies  Past Medical History:   Diagnosis Date    Anxiety     GERD (gastroesophageal reflux disease)     Hypertension      Past Surgical History:   Procedure Laterality Date    HAND SURGERY      ROTATOR CUFF REPAIR      SHOULDER SURGERY       No family history on file.  Social History     Tobacco Use    Smoking status: Never Smoker   Substance Use Topics    Alcohol use: Not on file    Drug use: Not on file     Review of Systems   Constitutional: Negative for fever.   HENT: Negative for sore throat.    Respiratory: Negative for shortness of breath.    Cardiovascular: Negative for chest pain.   Gastrointestinal: Positive for anal bleeding and rectal pain. Negative for abdominal distention, abdominal pain, blood in stool, constipation, nausea and vomiting.   Genitourinary: Negative for dysuria.   Musculoskeletal: Negative for back pain.   Skin: Negative for rash.   Neurological: Negative for weakness.   Hematological: Does not bruise/bleed easily.       Physical Exam     Initial Vitals [07/14/19 1350]   BP Pulse  Resp Temp SpO2   (!) 159/84 76 20 98.7 °F (37.1 °C) 97 %      MAP       --         Physical Exam    Nursing note and vitals reviewed.  Constitutional: He appears well-developed and well-nourished.   HENT:   Head: Atraumatic.   Eyes: EOM are normal. Pupils are equal, round, and reactive to light.   Neck: Normal range of motion. Neck supple. No JVD present.   Cardiovascular: Normal rate, regular rhythm, normal heart sounds and intact distal pulses. Exam reveals no gallop and no friction rub.    No murmur heard.  Pulmonary/Chest: Breath sounds normal.   Abdominal: Soft. Bowel sounds are normal. There is no tenderness.   Genitourinary:   Genitourinary Comments: No blood on digital rectal exam.  Patient appears to have an internal hemorrhoid at the 3 o'clock position.  Does not appear to be thrombosed.  No evidence of perirectal or perianal abscess on exam at this time.   Musculoskeletal: Normal range of motion.   Lymphadenopathy:     He has no cervical adenopathy.   Neurological: He is alert and oriented to person, place, and time. He has normal strength.   Skin: Skin is warm and dry.   Psychiatric: He has a normal mood and affect. Thought content normal.      Recommend stool softeners twice a day will also try either Proctofoam or Anusol HC suppositories due to internal hemorrhoid as patient is only using and external cream follow-up with General surgery.    ED Course   Procedures  Labs Reviewed - No data to display       Imaging Results    None                               Clinical Impression:       ICD-10-CM ICD-9-CM   1. Internal hemorrhoid, bleeding K64.8 455.2                                Haris Mccollum MD  07/15/19 8895

## 2021-04-14 ENCOUNTER — OFFICE VISIT (OUTPATIENT)
Dept: URGENT CARE | Facility: CLINIC | Age: 51
End: 2021-04-14
Payer: COMMERCIAL

## 2021-04-14 VITALS
SYSTOLIC BLOOD PRESSURE: 154 MMHG | DIASTOLIC BLOOD PRESSURE: 93 MMHG | BODY MASS INDEX: 33.91 KG/M2 | HEIGHT: 72 IN | TEMPERATURE: 98 F | OXYGEN SATURATION: 97 % | HEART RATE: 64 BPM | RESPIRATION RATE: 16 BRPM

## 2021-04-14 DIAGNOSIS — K62.5 RECTAL BLEEDING: ICD-10-CM

## 2021-04-14 DIAGNOSIS — Z87.19 H/O HEMORRHOIDS: ICD-10-CM

## 2021-04-14 DIAGNOSIS — K62.5 BRIGHT RED BLOOD PER RECTUM: Primary | ICD-10-CM

## 2021-04-14 PROCEDURE — 3008F BODY MASS INDEX DOCD: CPT | Mod: CPTII,S$GLB,, | Performed by: PHYSICIAN ASSISTANT

## 2021-04-14 PROCEDURE — 1125F AMNT PAIN NOTED PAIN PRSNT: CPT | Mod: S$GLB,,, | Performed by: PHYSICIAN ASSISTANT

## 2021-04-14 PROCEDURE — 1125F PR PAIN SEVERITY QUANTIFIED, PAIN PRESENT: ICD-10-PCS | Mod: S$GLB,,, | Performed by: PHYSICIAN ASSISTANT

## 2021-04-14 PROCEDURE — 3008F PR BODY MASS INDEX (BMI) DOCUMENTED: ICD-10-PCS | Mod: CPTII,S$GLB,, | Performed by: PHYSICIAN ASSISTANT

## 2021-04-14 PROCEDURE — 99203 OFFICE O/P NEW LOW 30 MIN: CPT | Mod: S$GLB,,, | Performed by: PHYSICIAN ASSISTANT

## 2021-04-14 PROCEDURE — 99203 PR OFFICE/OUTPT VISIT, NEW, LEVL III, 30-44 MIN: ICD-10-PCS | Mod: S$GLB,,, | Performed by: PHYSICIAN ASSISTANT

## 2021-04-14 RX ORDER — ESOMEPRAZOLE MAGNESIUM 40 MG/1
40 CAPSULE, DELAYED RELEASE ORAL
COMMUNITY
Start: 2015-10-15 | End: 2023-05-18

## 2021-04-14 RX ORDER — HYDROCORTISONE ACETATE 25 MG/1
25 SUPPOSITORY RECTAL 2 TIMES DAILY
Qty: 14 SUPPOSITORY | Refills: 0 | Status: SHIPPED | OUTPATIENT
Start: 2021-04-14 | End: 2021-04-21

## 2021-04-14 RX ORDER — FLUTICASONE PROPIONATE 50 MCG
1 SPRAY, SUSPENSION (ML) NASAL
COMMUNITY
Start: 2020-10-22 | End: 2023-05-18

## 2021-05-04 ENCOUNTER — PATIENT MESSAGE (OUTPATIENT)
Dept: RESEARCH | Facility: HOSPITAL | Age: 51
End: 2021-05-04

## 2021-05-10 ENCOUNTER — PATIENT MESSAGE (OUTPATIENT)
Dept: RESEARCH | Facility: HOSPITAL | Age: 51
End: 2021-05-10

## 2023-05-18 ENCOUNTER — OFFICE VISIT (OUTPATIENT)
Dept: SURGERY | Facility: CLINIC | Age: 53
End: 2023-05-18
Payer: COMMERCIAL

## 2023-05-18 VITALS
SYSTOLIC BLOOD PRESSURE: 129 MMHG | BODY MASS INDEX: 28.7 KG/M2 | HEART RATE: 62 BPM | WEIGHT: 211.88 LBS | DIASTOLIC BLOOD PRESSURE: 85 MMHG | HEIGHT: 72 IN

## 2023-05-18 DIAGNOSIS — K64.8 INTERNAL HEMORRHOIDS: ICD-10-CM

## 2023-05-18 DIAGNOSIS — K59.09 CHRONIC CONSTIPATION: Primary | ICD-10-CM

## 2023-05-18 DIAGNOSIS — K60.2 ANAL FISSURE: ICD-10-CM

## 2023-05-18 PROCEDURE — 99204 PR OFFICE/OUTPT VISIT, NEW, LEVL IV, 45-59 MIN: ICD-10-PCS | Mod: S$GLB,,, | Performed by: NURSE PRACTITIONER

## 2023-05-18 PROCEDURE — 1160F RVW MEDS BY RX/DR IN RCRD: CPT | Mod: CPTII,S$GLB,, | Performed by: NURSE PRACTITIONER

## 2023-05-18 PROCEDURE — 1160F PR REVIEW ALL MEDS BY PRESCRIBER/CLIN PHARMACIST DOCUMENTED: ICD-10-PCS | Mod: CPTII,S$GLB,, | Performed by: NURSE PRACTITIONER

## 2023-05-18 PROCEDURE — 99999 PR PBB SHADOW E&M-EST. PATIENT-LVL IV: ICD-10-PCS | Mod: PBBFAC,,, | Performed by: NURSE PRACTITIONER

## 2023-05-18 PROCEDURE — 3074F SYST BP LT 130 MM HG: CPT | Mod: CPTII,S$GLB,, | Performed by: NURSE PRACTITIONER

## 2023-05-18 PROCEDURE — 3079F DIAST BP 80-89 MM HG: CPT | Mod: CPTII,S$GLB,, | Performed by: NURSE PRACTITIONER

## 2023-05-18 PROCEDURE — 4010F PR ACE/ARB THEARPY RXD/TAKEN: ICD-10-PCS | Mod: CPTII,S$GLB,, | Performed by: NURSE PRACTITIONER

## 2023-05-18 PROCEDURE — 99999 PR PBB SHADOW E&M-EST. PATIENT-LVL IV: CPT | Mod: PBBFAC,,, | Performed by: NURSE PRACTITIONER

## 2023-05-18 PROCEDURE — 1159F MED LIST DOCD IN RCRD: CPT | Mod: CPTII,S$GLB,, | Performed by: NURSE PRACTITIONER

## 2023-05-18 PROCEDURE — 3008F BODY MASS INDEX DOCD: CPT | Mod: CPTII,S$GLB,, | Performed by: NURSE PRACTITIONER

## 2023-05-18 PROCEDURE — 3008F PR BODY MASS INDEX (BMI) DOCUMENTED: ICD-10-PCS | Mod: CPTII,S$GLB,, | Performed by: NURSE PRACTITIONER

## 2023-05-18 PROCEDURE — 3074F PR MOST RECENT SYSTOLIC BLOOD PRESSURE < 130 MM HG: ICD-10-PCS | Mod: CPTII,S$GLB,, | Performed by: NURSE PRACTITIONER

## 2023-05-18 PROCEDURE — 99204 OFFICE O/P NEW MOD 45 MIN: CPT | Mod: S$GLB,,, | Performed by: NURSE PRACTITIONER

## 2023-05-18 PROCEDURE — 4010F ACE/ARB THERAPY RXD/TAKEN: CPT | Mod: CPTII,S$GLB,, | Performed by: NURSE PRACTITIONER

## 2023-05-18 PROCEDURE — 1159F PR MEDICATION LIST DOCUMENTED IN MEDICAL RECORD: ICD-10-PCS | Mod: CPTII,S$GLB,, | Performed by: NURSE PRACTITIONER

## 2023-05-18 PROCEDURE — 3079F PR MOST RECENT DIASTOLIC BLOOD PRESSURE 80-89 MM HG: ICD-10-PCS | Mod: CPTII,S$GLB,, | Performed by: NURSE PRACTITIONER

## 2023-05-18 RX ORDER — CYANOCOBALAMIN 1000 UG/ML
1000 INJECTION, SOLUTION INTRAMUSCULAR; SUBCUTANEOUS
COMMUNITY
Start: 2023-05-04

## 2023-05-18 RX ORDER — LOSARTAN POTASSIUM AND HYDROCHLOROTHIAZIDE 12.5; 5 MG/1; MG/1
1 TABLET ORAL
COMMUNITY
Start: 2023-05-12

## 2023-05-18 RX ORDER — HYDROCORTISONE ACETATE 25 MG/1
25 SUPPOSITORY RECTAL 2 TIMES DAILY
Qty: 24 SUPPOSITORY | Refills: 1 | Status: SHIPPED | OUTPATIENT
Start: 2023-05-18 | End: 2023-06-11

## 2023-05-18 NOTE — PROGRESS NOTES
CRS Office Visit History and Physical    Referring Md:   Aaareferral Self  No address on file    SUBJECTIVE:     Chief Complaint: hemorrhoid    History of Present Illness:  The patient is new patient to this practice.   Course is as follows:  Patient is a 53 y.o. male presents with possible hemorrhoid. +hard external ball at anus.  Anal pain and rectal bleeding with bowel movement.   Started semaglutide for weight loss approx 6 months ago  Symptoms have been present for 6 months.  Has tried anusol suppositories prn.  Associated bleeding: yes  Previous anorectal procedures: No  confirms straining/prolonged time on toilet with bowel movements.  is currently taking Linzess 290 mcg prn constipation and stool softeners. Having a bm 1-3x/week pending medications taken.  Loose liquid stools on linzess.  Blood thinners: No    Last Colonoscopy: 1-2 yrs ago normal at  per pt report with 10 yr recall.   Family history of colorectal cancer or IBD: none.    Review of patient's allergies indicates:  No Known Allergies    Past Medical History:   Diagnosis Date    Anxiety     GERD (gastroesophageal reflux disease)     Hypertension      Past Surgical History:   Procedure Laterality Date    HAND SURGERY      ROTATOR CUFF REPAIR      SHOULDER SURGERY       Family History   Problem Relation Age of Onset    Cancer Mother     Cancer Father     Diabetes Father     Hypertension Father      Social History     Tobacco Use    Smoking status: Never    Smokeless tobacco: Never   Substance Use Topics    Alcohol use: Yes     Alcohol/week: 0.0 standard drinks     Comment: social    Drug use: Never        Review of Systems:  Review of Systems   Gastrointestinal:  Positive for blood in stool and constipation.        Anal pain     OBJECTIVE:     Vital Signs (Most Recent)  Blood Pressure 129/85 (BP Location: Left arm, Patient Position: Sitting, BP Method: Large (Automatic))   Pulse 62   Height 6' (1.829 m)   Weight 96.1 kg (211 lb 14.4 oz)    Body Mass Index 28.74 kg/m²     Physical Exam:  General: White male in no distress   Neuro: Alert and oriented to person, place, and time.  Moves all extremities.     HEENT: No icterus.  Trachea midline  Respiratory: Respirations are even and unlabored, no cough or audible wheezing  Skin: Warm dry and intact, No visible rashes, no jaundice    Labs reviewed today:  Lab Results   Component Value Date    WBC 6.47 04/11/2017    HGB 14.5 04/11/2017    HCT 44.0 04/11/2017     04/11/2017    CHOL 157 04/10/2017    TRIG 262 (H) 04/10/2017    HDL 22 (L) 04/10/2017    ALT 33 04/11/2017    AST 22 04/11/2017     04/11/2017    K 4.0 04/11/2017     04/11/2017    CREATININE 1.0 04/11/2017    BUN 18 04/11/2017    CO2 26 04/11/2017    TSH 4.315 (H) 04/10/2017    INR 1.0 04/10/2017       Imaging reviewed today:  none    Endoscopy reviewed today:  none    Anorectal Exam:    Anal Skin:  midline posterior anal fissure    Digital Rectal Exam:  Resting Tone normal  Mass none  Tenderness  absent    Anoscopy:  deferred      ASSESSMENT/PLAN:     Dean was seen today for anal fissure, hemorrhoids and rectal pain.    Diagnoses and all orders for this visit:    Chronic constipation  -     linaCLOtide (LINZESS) 145 mcg Cap capsule; Take 1 capsule (145 mcg total) by mouth before breakfast.    Internal hemorrhoids  -     hydrocortisone (ANUSOL-HC) 25 mg suppository; Place 1 suppository (25 mg total) rectally 2 (two) times daily. for 24 days    Anal fissure  -     Discontinue: diltiazem HCl (DILTIAZEM 2% CREAM); Apply topically 3 (three) times daily. Apply topically to anal area.  -     diltiazem HCl (DILTIAZEM 2% CREAM); Apply topically 3 (three) times daily. Apply topically to anal area.        The patient was instructed to:  Diltiazem tid  Warm soaks  Decrease linzess and take daily  Ansuol suppositories bid for 12 days for hemorrhoids  Lidocaine prn anal pain  Increase water intake to at least 8-10 glasses of water per  day.  Take a daily fiber supplement (Konsyl, Benefiber, Metamucil) and increase dietary intake to 20-30 grams/day.  Avoid straining or spending >5min/event with bowel movements.  Follow-up in clinic prn with MD if no improvement.      Lizy Bryant, PABLOP-C  Colon and Rectal Surgery

## 2023-05-18 NOTE — PATIENT INSTRUCTIONS
Decrease Linzess to 145 mcg and take daily with breakfast.  Daily fiber supplement like metamucil, citrucel, fibercon, etc.   Water intake > 64 oz/day.  Diltiazem cream 3x/day. $45-50 from compounding pharmacy.   [facility], [facility], [facility], [facility], [facility].  Warm soaks.  Refilled Anusol suppositories. 2x/day for 12 days as needed for hemorrhoids.  If no improvement in 2 weeks, please message/call for appt with MD to discuss next steps.